# Patient Record
Sex: FEMALE | Race: WHITE | NOT HISPANIC OR LATINO
[De-identification: names, ages, dates, MRNs, and addresses within clinical notes are randomized per-mention and may not be internally consistent; named-entity substitution may affect disease eponyms.]

---

## 2017-07-28 PROBLEM — Z00.00 ENCOUNTER FOR PREVENTIVE HEALTH EXAMINATION: Status: ACTIVE | Noted: 2017-07-28

## 2017-08-02 ENCOUNTER — APPOINTMENT (OUTPATIENT)
Dept: SURGICAL ONCOLOGY | Facility: CLINIC | Age: 57
End: 2017-08-02
Payer: COMMERCIAL

## 2017-08-02 VITALS
HEIGHT: 66 IN | BODY MASS INDEX: 33.43 KG/M2 | DIASTOLIC BLOOD PRESSURE: 79 MMHG | HEART RATE: 86 BPM | WEIGHT: 208 LBS | OXYGEN SATURATION: 95 % | SYSTOLIC BLOOD PRESSURE: 120 MMHG

## 2017-08-02 DIAGNOSIS — Z86.19 PERSONAL HISTORY OF OTHER INFECTIOUS AND PARASITIC DISEASES: ICD-10-CM

## 2017-08-02 DIAGNOSIS — Z85.3 PERSONAL HISTORY OF MALIGNANT NEOPLASM OF BREAST: ICD-10-CM

## 2017-08-02 DIAGNOSIS — Z80.1 FAMILY HISTORY OF MALIGNANT NEOPLASM OF TRACHEA, BRONCHUS AND LUNG: ICD-10-CM

## 2017-08-02 PROCEDURE — 99205 OFFICE O/P NEW HI 60 MIN: CPT

## 2017-08-07 ENCOUNTER — OUTPATIENT (OUTPATIENT)
Dept: OUTPATIENT SERVICES | Facility: HOSPITAL | Age: 57
LOS: 1 days | End: 2017-08-07
Payer: COMMERCIAL

## 2017-08-07 ENCOUNTER — RESULT REVIEW (OUTPATIENT)
Age: 57
End: 2017-08-07

## 2017-08-07 DIAGNOSIS — N63 UNSPECIFIED LUMP IN BREAST: ICD-10-CM

## 2017-08-07 PROCEDURE — 88321 CONSLTJ&REPRT SLD PREP ELSWR: CPT

## 2017-08-08 LAB — SURGICAL PATHOLOGY STUDY: SIGNIFICANT CHANGE UP

## 2017-08-11 ENCOUNTER — OUTPATIENT (OUTPATIENT)
Dept: OUTPATIENT SERVICES | Facility: HOSPITAL | Age: 57
LOS: 1 days | End: 2017-08-11
Payer: COMMERCIAL

## 2017-08-11 ENCOUNTER — APPOINTMENT (OUTPATIENT)
Dept: ULTRASOUND IMAGING | Facility: IMAGING CENTER | Age: 57
End: 2017-08-11
Payer: COMMERCIAL

## 2017-08-11 VITALS
TEMPERATURE: 98 F | HEART RATE: 67 BPM | DIASTOLIC BLOOD PRESSURE: 88 MMHG | HEIGHT: 65 IN | OXYGEN SATURATION: 96 % | WEIGHT: 210.1 LBS | SYSTOLIC BLOOD PRESSURE: 122 MMHG | RESPIRATION RATE: 16 BRPM

## 2017-08-11 DIAGNOSIS — C50.919 MALIGNANT NEOPLASM OF UNSPECIFIED SITE OF UNSPECIFIED FEMALE BREAST: ICD-10-CM

## 2017-08-11 DIAGNOSIS — C50.912 MALIGNANT NEOPLASM OF UNSPECIFIED SITE OF LEFT FEMALE BREAST: ICD-10-CM

## 2017-08-11 DIAGNOSIS — Z01.818 ENCOUNTER FOR OTHER PREPROCEDURAL EXAMINATION: ICD-10-CM

## 2017-08-11 DIAGNOSIS — Z00.8 ENCOUNTER FOR OTHER GENERAL EXAMINATION: ICD-10-CM

## 2017-08-11 PROCEDURE — G0463: CPT

## 2017-08-11 PROCEDURE — 19285 PERQ DEV BREAST 1ST US IMAG: CPT | Mod: LT

## 2017-08-11 PROCEDURE — 19285 PERQ DEV BREAST 1ST US IMAG: CPT

## 2017-08-11 PROCEDURE — C1739: CPT

## 2017-08-11 NOTE — H&P PST ADULT - PROBLEM SELECTOR PLAN 1
Left breast lumpectomy post reflector saviscout localization & a left axillary sentinel lymph node biopsy

## 2017-08-11 NOTE — H&P PST ADULT - HISTORY OF PRESENT ILLNESS
56 y/o f with no sig PMH presents with recent dx of breast cancer found on biopsy pre left breast lumpectomy post reflector saviscout localization and a left axillary sentinel lymph node biopsy.

## 2017-08-14 RX ORDER — ACETAMINOPHEN 500 MG
975 TABLET ORAL ONCE
Qty: 0 | Refills: 0 | Status: COMPLETED | OUTPATIENT
Start: 2017-08-16 | End: 2017-08-16

## 2017-08-14 RX ORDER — CELECOXIB 200 MG/1
200 CAPSULE ORAL ONCE
Qty: 0 | Refills: 0 | Status: COMPLETED | OUTPATIENT
Start: 2017-08-16 | End: 2017-08-16

## 2017-08-14 RX ORDER — CEFAZOLIN SODIUM 1 G
2000 VIAL (EA) INJECTION ONCE
Qty: 0 | Refills: 0 | Status: DISCONTINUED | OUTPATIENT
Start: 2017-08-16 | End: 2017-08-31

## 2017-08-14 RX ORDER — SODIUM CHLORIDE 9 MG/ML
3 INJECTION INTRAMUSCULAR; INTRAVENOUS; SUBCUTANEOUS EVERY 8 HOURS
Qty: 0 | Refills: 0 | Status: DISCONTINUED | OUTPATIENT
Start: 2017-08-16 | End: 2017-08-31

## 2017-08-14 RX ORDER — LIDOCAINE HCL 20 MG/ML
0.2 VIAL (ML) INJECTION ONCE
Qty: 0 | Refills: 0 | Status: DISCONTINUED | OUTPATIENT
Start: 2017-08-16 | End: 2017-08-31

## 2017-08-16 ENCOUNTER — APPOINTMENT (OUTPATIENT)
Dept: NUCLEAR MEDICINE | Facility: HOSPITAL | Age: 57
End: 2017-08-16

## 2017-08-16 ENCOUNTER — RESULT REVIEW (OUTPATIENT)
Age: 57
End: 2017-08-16

## 2017-08-16 ENCOUNTER — TRANSCRIPTION ENCOUNTER (OUTPATIENT)
Age: 57
End: 2017-08-16

## 2017-08-16 ENCOUNTER — APPOINTMENT (OUTPATIENT)
Dept: SURGICAL ONCOLOGY | Facility: HOSPITAL | Age: 57
End: 2017-08-16
Payer: COMMERCIAL

## 2017-08-16 ENCOUNTER — OUTPATIENT (OUTPATIENT)
Dept: OUTPATIENT SERVICES | Facility: HOSPITAL | Age: 57
LOS: 1 days | End: 2017-08-16
Payer: COMMERCIAL

## 2017-08-16 VITALS
RESPIRATION RATE: 16 BRPM | OXYGEN SATURATION: 98 % | DIASTOLIC BLOOD PRESSURE: 71 MMHG | SYSTOLIC BLOOD PRESSURE: 114 MMHG | HEART RATE: 70 BPM | TEMPERATURE: 98 F

## 2017-08-16 VITALS — HEIGHT: 65 IN | WEIGHT: 210.1 LBS

## 2017-08-16 VITALS
DIASTOLIC BLOOD PRESSURE: 88 MMHG | TEMPERATURE: 98 F | RESPIRATION RATE: 16 BRPM | HEART RATE: 78 BPM | WEIGHT: 210.1 LBS | SYSTOLIC BLOOD PRESSURE: 122 MMHG | HEIGHT: 65 IN | OXYGEN SATURATION: 96 %

## 2017-08-16 DIAGNOSIS — C50.919 MALIGNANT NEOPLASM OF UNSPECIFIED SITE OF UNSPECIFIED FEMALE BREAST: ICD-10-CM

## 2017-08-16 DIAGNOSIS — C50.912 MALIGNANT NEOPLASM OF UNSPECIFIED SITE OF LEFT FEMALE BREAST: ICD-10-CM

## 2017-08-16 PROCEDURE — 38900 IO MAP OF SENT LYMPH NODE: CPT | Mod: LT

## 2017-08-16 PROCEDURE — 38525 BIOPSY/REMOVAL LYMPH NODES: CPT | Mod: LT

## 2017-08-16 PROCEDURE — 38308 INCISION OF LYMPH CHANNELS: CPT

## 2017-08-16 PROCEDURE — 88307 TISSUE EXAM BY PATHOLOGIST: CPT | Mod: 26

## 2017-08-16 PROCEDURE — 38790 INJECT FOR LYMPHATIC X-RAY: CPT | Mod: 59

## 2017-08-16 PROCEDURE — 19301 PARTIAL MASTECTOMY: CPT | Mod: LT

## 2017-08-16 PROCEDURE — 19302 P-MASTECTOMY W/LN REMOVAL: CPT | Mod: LT

## 2017-08-16 PROCEDURE — A9541: CPT

## 2017-08-16 PROCEDURE — 88305 TISSUE EXAM BY PATHOLOGIST: CPT

## 2017-08-16 PROCEDURE — 88307 TISSUE EXAM BY PATHOLOGIST: CPT

## 2017-08-16 PROCEDURE — 88305 TISSUE EXAM BY PATHOLOGIST: CPT | Mod: 26

## 2017-08-16 PROCEDURE — 76098 X-RAY EXAM SURGICAL SPECIMEN: CPT | Mod: 26

## 2017-08-16 PROCEDURE — 38900 IO MAP OF SENT LYMPH NODE: CPT

## 2017-08-16 PROCEDURE — 38792 RA TRACER ID OF SENTINL NODE: CPT | Mod: 59

## 2017-08-16 PROCEDURE — 76098 X-RAY EXAM SURGICAL SPECIMEN: CPT

## 2017-08-16 RX ORDER — ONDANSETRON 8 MG/1
4 TABLET, FILM COATED ORAL ONCE
Qty: 0 | Refills: 0 | Status: COMPLETED | OUTPATIENT
Start: 2017-08-16 | End: 2017-08-16

## 2017-08-16 RX ORDER — CELECOXIB 200 MG/1
200 CAPSULE ORAL ONCE
Qty: 0 | Refills: 0 | Status: COMPLETED | OUTPATIENT
Start: 2017-08-16 | End: 2017-08-16

## 2017-08-16 RX ORDER — OXYCODONE HYDROCHLORIDE 5 MG/1
10 TABLET ORAL ONCE
Qty: 0 | Refills: 0 | Status: DISCONTINUED | OUTPATIENT
Start: 2017-08-16 | End: 2017-08-16

## 2017-08-16 RX ORDER — FAMOTIDINE 10 MG/ML
0 INJECTION INTRAVENOUS
Qty: 0 | Refills: 0 | COMMUNITY

## 2017-08-16 RX ORDER — OXYCODONE HYDROCHLORIDE 5 MG/1
5 TABLET ORAL ONCE
Qty: 0 | Refills: 0 | Status: DISCONTINUED | OUTPATIENT
Start: 2017-08-16 | End: 2017-08-16

## 2017-08-16 RX ORDER — SODIUM CHLORIDE 9 MG/ML
1000 INJECTION INTRAMUSCULAR; INTRAVENOUS; SUBCUTANEOUS
Qty: 0 | Refills: 0 | Status: DISCONTINUED | OUTPATIENT
Start: 2017-08-16 | End: 2017-08-31

## 2017-08-16 RX ADMIN — Medication 975 MILLIGRAM(S): at 07:13

## 2017-08-16 RX ADMIN — OXYCODONE HYDROCHLORIDE 5 MILLIGRAM(S): 5 TABLET ORAL at 10:22

## 2017-08-16 RX ADMIN — CELECOXIB 200 MILLIGRAM(S): 200 CAPSULE ORAL at 10:21

## 2017-08-16 RX ADMIN — CELECOXIB 200 MILLIGRAM(S): 200 CAPSULE ORAL at 07:13

## 2017-08-16 RX ADMIN — ONDANSETRON 4 MILLIGRAM(S): 8 TABLET, FILM COATED ORAL at 10:22

## 2017-08-16 NOTE — PROGRESS NOTE ADULT - SUBJECTIVE AND OBJECTIVE BOX
Brief Operative Note    Attending: Dr. Francis    Resident:  Diana Poe    Preoperative Diagnosis: Malignant neoplasm of left female breast  Shingles  Breast cancer in female  No significant past surgical history  MALIGNANT NEOPLASM OF UNSPECIF      Postoperative Diagnosis: same    Procedure: L breast lumpectomy, L sentinnel lymph node biopsy    Findings: as dictated    EBL: minimal    Complications: none    Condition upon return to PACU: stable

## 2017-08-16 NOTE — ASU DISCHARGE PLAN (ADULT/PEDIATRIC). - MEDICATION SUMMARY - MEDICATIONS TO TAKE
I will START or STAY ON the medications listed below when I get home from the hospital:    oxycodone-acetaminophen 5mg-325mg oral tablet  -- 1 tab(s) by mouth every 6 hours, As Needed MDD:8 tablets daily  -- Caution federal law prohibits the transfer of this drug to any person other  than the person for whom it was prescribed.  May cause drowsiness.  Alcohol may intensify this effect.  Use care when operating dangerous machinery.  This prescription cannot be refilled.  This product contains acetaminophen.  Do not use  with any other product containing acetaminophen to prevent possible liver damage.  Using more of this medication than prescribed may cause serious breathing problems.    -- Indication: For Moderate-severe pain

## 2017-08-16 NOTE — PROGRESS NOTE ADULT - ASSESSMENT
57F with history of diagnosed L breast cancer s/p L breast lumpectomy with L sentinnel lymph node biopsy.

## 2017-08-16 NOTE — PRE-ANESTHESIA EVALUATION ADULT - NSANTHOSAYNRD_GEN_A_CORE
No. NEDA screening performed.  STOP BANG Legend: 0-2 = LOW Risk; 3-4 = INTERMEDIATE Risk; 5-8 = HIGH Risk

## 2017-08-22 LAB — SURGICAL PATHOLOGY STUDY: SIGNIFICANT CHANGE UP

## 2017-08-28 ENCOUNTER — APPOINTMENT (OUTPATIENT)
Dept: SURGICAL ONCOLOGY | Facility: CLINIC | Age: 57
End: 2017-08-28
Payer: COMMERCIAL

## 2017-08-28 VITALS
RESPIRATION RATE: 15 BRPM | BODY MASS INDEX: 32.95 KG/M2 | HEIGHT: 66 IN | DIASTOLIC BLOOD PRESSURE: 73 MMHG | HEART RATE: 73 BPM | OXYGEN SATURATION: 95 % | WEIGHT: 205 LBS | SYSTOLIC BLOOD PRESSURE: 105 MMHG

## 2017-08-28 PROCEDURE — 99024 POSTOP FOLLOW-UP VISIT: CPT

## 2017-09-13 ENCOUNTER — TRANSCRIPTION ENCOUNTER (OUTPATIENT)
Age: 57
End: 2017-09-13

## 2017-09-13 ENCOUNTER — OUTPATIENT (OUTPATIENT)
Dept: OUTPATIENT SERVICES | Facility: HOSPITAL | Age: 57
LOS: 1 days | End: 2017-09-13
Payer: COMMERCIAL

## 2017-09-13 ENCOUNTER — RESULT REVIEW (OUTPATIENT)
Age: 57
End: 2017-09-13

## 2017-09-13 ENCOUNTER — APPOINTMENT (OUTPATIENT)
Dept: SURGICAL ONCOLOGY | Facility: HOSPITAL | Age: 57
End: 2017-09-13
Payer: COMMERCIAL

## 2017-09-13 VITALS
OXYGEN SATURATION: 96 % | RESPIRATION RATE: 16 BRPM | SYSTOLIC BLOOD PRESSURE: 114 MMHG | HEART RATE: 71 BPM | TEMPERATURE: 99 F | DIASTOLIC BLOOD PRESSURE: 74 MMHG | WEIGHT: 210.1 LBS | HEIGHT: 65 IN

## 2017-09-13 VITALS
OXYGEN SATURATION: 97 % | RESPIRATION RATE: 18 BRPM | DIASTOLIC BLOOD PRESSURE: 78 MMHG | HEART RATE: 78 BPM | TEMPERATURE: 37 F | SYSTOLIC BLOOD PRESSURE: 120 MMHG

## 2017-09-13 DIAGNOSIS — C50.912 MALIGNANT NEOPLASM OF UNSPECIFIED SITE OF LEFT FEMALE BREAST: ICD-10-CM

## 2017-09-13 DIAGNOSIS — Z01.818 ENCOUNTER FOR OTHER PREPROCEDURAL EXAMINATION: ICD-10-CM

## 2017-09-13 PROCEDURE — 38745 REMOVE ARMPIT LYMPH NODES: CPT | Mod: 78,LT

## 2017-09-13 PROCEDURE — 19301 PARTIAL MASTECTOMY: CPT | Mod: LT,XS

## 2017-09-13 PROCEDURE — 38900 IO MAP OF SENT LYMPH NODE: CPT | Mod: LT

## 2017-09-13 PROCEDURE — 38745 REMOVE ARMPIT LYMPH NODES: CPT | Mod: LT

## 2017-09-13 PROCEDURE — 88307 TISSUE EXAM BY PATHOLOGIST: CPT | Mod: 26

## 2017-09-13 PROCEDURE — 88307 TISSUE EXAM BY PATHOLOGIST: CPT

## 2017-09-13 RX ORDER — CELECOXIB 200 MG/1
200 CAPSULE ORAL ONCE
Qty: 0 | Refills: 0 | Status: DISCONTINUED | OUTPATIENT
Start: 2017-09-13 | End: 2017-09-28

## 2017-09-13 RX ORDER — FAMOTIDINE 10 MG/ML
20 INJECTION INTRAVENOUS ONCE
Qty: 0 | Refills: 0 | Status: COMPLETED | OUTPATIENT
Start: 2017-09-13 | End: 2017-09-13

## 2017-09-13 RX ORDER — OXYCODONE HYDROCHLORIDE 5 MG/1
10 TABLET ORAL ONCE
Qty: 0 | Refills: 0 | Status: DISCONTINUED | OUTPATIENT
Start: 2017-09-13 | End: 2017-09-13

## 2017-09-13 RX ORDER — HYDROMORPHONE HYDROCHLORIDE 2 MG/ML
0.5 INJECTION INTRAMUSCULAR; INTRAVENOUS; SUBCUTANEOUS ONCE
Qty: 0 | Refills: 0 | Status: DISCONTINUED | OUTPATIENT
Start: 2017-09-13 | End: 2017-09-13

## 2017-09-13 RX ORDER — CELECOXIB 200 MG/1
200 CAPSULE ORAL ONCE
Qty: 0 | Refills: 0 | Status: COMPLETED | OUTPATIENT
Start: 2017-09-13 | End: 2017-09-13

## 2017-09-13 RX ORDER — SODIUM CHLORIDE 9 MG/ML
1000 INJECTION, SOLUTION INTRAVENOUS
Qty: 0 | Refills: 0 | Status: DISCONTINUED | OUTPATIENT
Start: 2017-09-13 | End: 2017-09-28

## 2017-09-13 RX ORDER — HYDROMORPHONE HYDROCHLORIDE 2 MG/ML
0.5 INJECTION INTRAMUSCULAR; INTRAVENOUS; SUBCUTANEOUS ONCE
Qty: 0 | Refills: 0 | Status: DISCONTINUED | OUTPATIENT
Start: 2017-09-13 | End: 2017-09-28

## 2017-09-13 RX ORDER — ONDANSETRON 8 MG/1
4 TABLET, FILM COATED ORAL ONCE
Qty: 0 | Refills: 0 | Status: COMPLETED | OUTPATIENT
Start: 2017-09-13 | End: 2017-09-13

## 2017-09-13 RX ORDER — ACETAMINOPHEN 500 MG
975 TABLET ORAL ONCE
Qty: 0 | Refills: 0 | Status: COMPLETED | OUTPATIENT
Start: 2017-09-13 | End: 2017-09-13

## 2017-09-13 RX ADMIN — OXYCODONE HYDROCHLORIDE 10 MILLIGRAM(S): 5 TABLET ORAL at 12:18

## 2017-09-13 RX ADMIN — Medication 975 MILLIGRAM(S): at 10:29

## 2017-09-13 RX ADMIN — CELECOXIB 200 MILLIGRAM(S): 200 CAPSULE ORAL at 10:29

## 2017-09-13 RX ADMIN — ONDANSETRON 4 MILLIGRAM(S): 8 TABLET, FILM COATED ORAL at 12:18

## 2017-09-13 RX ADMIN — FAMOTIDINE 20 MILLIGRAM(S): 10 INJECTION INTRAVENOUS at 10:29

## 2017-09-13 RX ADMIN — HYDROMORPHONE HYDROCHLORIDE 0.5 MILLIGRAM(S): 2 INJECTION INTRAMUSCULAR; INTRAVENOUS; SUBCUTANEOUS at 13:14

## 2017-09-13 NOTE — BRIEF OPERATIVE NOTE - PROCEDURE
<<-----Click on this checkbox to enter Procedure Axillary lymph node dissection on left  09/13/2017    Active  [id number]

## 2017-09-13 NOTE — ASU DISCHARGE PLAN (ADULT/PEDIATRIC). - NOTIFY
Fever greater than 101/Pain not relieved by Medications/Numbness, color, or temperature change to extremity

## 2017-09-13 NOTE — ASU DISCHARGE PLAN (ADULT/PEDIATRIC). - SPECIAL INSTRUCTIONS
Please empty your drain as instructed. You may take the Percocet for pain as instructed, but do not drive while taking them. You may shower after 1 day, but please keep the Steri strips on until they fall off and do not soak or swim. Please call the office sooner if you have any other concerns.

## 2017-09-14 ENCOUNTER — MESSAGE (OUTPATIENT)
Age: 57
End: 2017-09-14

## 2017-09-18 ENCOUNTER — APPOINTMENT (OUTPATIENT)
Dept: SURGICAL ONCOLOGY | Facility: CLINIC | Age: 57
End: 2017-09-18
Payer: COMMERCIAL

## 2017-09-18 VITALS
WEIGHT: 205 LBS | HEIGHT: 66 IN | SYSTOLIC BLOOD PRESSURE: 108 MMHG | TEMPERATURE: 99.5 F | DIASTOLIC BLOOD PRESSURE: 75 MMHG | RESPIRATION RATE: 16 BRPM | BODY MASS INDEX: 32.95 KG/M2 | OXYGEN SATURATION: 97 % | HEART RATE: 114 BPM

## 2017-09-18 LAB — SURGICAL PATHOLOGY STUDY: SIGNIFICANT CHANGE UP

## 2017-09-18 PROCEDURE — 10140 I&D HMTMA SEROMA/FLUID COLLJ: CPT | Mod: 79

## 2017-09-18 PROCEDURE — 99024 POSTOP FOLLOW-UP VISIT: CPT

## 2017-09-20 ENCOUNTER — APPOINTMENT (OUTPATIENT)
Dept: SURGICAL ONCOLOGY | Facility: CLINIC | Age: 57
End: 2017-09-20

## 2017-09-25 ENCOUNTER — APPOINTMENT (OUTPATIENT)
Dept: SURGICAL ONCOLOGY | Facility: CLINIC | Age: 57
End: 2017-09-25
Payer: COMMERCIAL

## 2017-09-25 VITALS
HEART RATE: 78 BPM | SYSTOLIC BLOOD PRESSURE: 94 MMHG | RESPIRATION RATE: 15 BRPM | OXYGEN SATURATION: 95 % | DIASTOLIC BLOOD PRESSURE: 64 MMHG | BODY MASS INDEX: 32.95 KG/M2 | WEIGHT: 205 LBS | HEIGHT: 66 IN

## 2017-09-25 PROCEDURE — 10140 I&D HMTMA SEROMA/FLUID COLLJ: CPT | Mod: 79

## 2017-09-25 PROCEDURE — 99024 POSTOP FOLLOW-UP VISIT: CPT

## 2017-09-28 ENCOUNTER — APPOINTMENT (OUTPATIENT)
Dept: SURGICAL ONCOLOGY | Facility: CLINIC | Age: 57
End: 2017-09-28
Payer: COMMERCIAL

## 2017-10-02 ENCOUNTER — APPOINTMENT (OUTPATIENT)
Dept: SURGICAL ONCOLOGY | Facility: CLINIC | Age: 57
End: 2017-10-02
Payer: COMMERCIAL

## 2017-10-02 VITALS
BODY MASS INDEX: 33.91 KG/M2 | OXYGEN SATURATION: 96 % | HEART RATE: 72 BPM | WEIGHT: 211 LBS | HEIGHT: 66 IN | RESPIRATION RATE: 16 BRPM | SYSTOLIC BLOOD PRESSURE: 117 MMHG | DIASTOLIC BLOOD PRESSURE: 79 MMHG

## 2017-10-02 PROCEDURE — 99024 POSTOP FOLLOW-UP VISIT: CPT

## 2017-10-04 ENCOUNTER — APPOINTMENT (OUTPATIENT)
Dept: SURGICAL ONCOLOGY | Facility: CLINIC | Age: 57
End: 2017-10-04
Payer: COMMERCIAL

## 2017-10-04 VITALS
SYSTOLIC BLOOD PRESSURE: 106 MMHG | DIASTOLIC BLOOD PRESSURE: 67 MMHG | OXYGEN SATURATION: 96 % | RESPIRATION RATE: 16 BRPM | WEIGHT: 211 LBS | HEART RATE: 65 BPM | HEIGHT: 66 IN | BODY MASS INDEX: 33.91 KG/M2

## 2017-10-04 PROCEDURE — 10140 I&D HMTMA SEROMA/FLUID COLLJ: CPT | Mod: 79

## 2017-10-04 PROCEDURE — 99024 POSTOP FOLLOW-UP VISIT: CPT

## 2017-10-09 ENCOUNTER — OUTPATIENT (OUTPATIENT)
Dept: OUTPATIENT SERVICES | Facility: HOSPITAL | Age: 57
LOS: 1 days | Discharge: HOME | End: 2017-10-09

## 2017-10-09 DIAGNOSIS — C50.919 MALIGNANT NEOPLASM OF UNSPECIFIED SITE OF UNSPECIFIED FEMALE BREAST: ICD-10-CM

## 2017-10-12 ENCOUNTER — APPOINTMENT (OUTPATIENT)
Dept: HEMATOLOGY ONCOLOGY | Facility: CLINIC | Age: 57
End: 2017-10-12
Payer: COMMERCIAL

## 2017-10-12 VITALS
HEIGHT: 66.42 IN | SYSTOLIC BLOOD PRESSURE: 126 MMHG | RESPIRATION RATE: 16 BRPM | HEART RATE: 73 BPM | DIASTOLIC BLOOD PRESSURE: 85 MMHG | OXYGEN SATURATION: 96 % | WEIGHT: 213.41 LBS | BODY MASS INDEX: 33.89 KG/M2 | TEMPERATURE: 98.3 F

## 2017-10-12 PROCEDURE — 99245 OFF/OP CONSLTJ NEW/EST HI 55: CPT

## 2017-10-13 DIAGNOSIS — C50.512 MALIGNANT NEOPLASM OF LOWER-OUTER QUADRANT OF LEFT FEMALE BREAST: ICD-10-CM

## 2017-10-13 RX ORDER — OXYCODONE AND ACETAMINOPHEN 10; 325 MG/1; MG/1
10-325 TABLET ORAL
Qty: 40 | Refills: 0 | Status: DISCONTINUED | COMMUNITY
Start: 2017-09-18 | End: 2017-10-13

## 2017-10-13 RX ORDER — FLUOXETINE HYDROCHLORIDE 20 MG/1
20 CAPSULE ORAL TWICE DAILY
Refills: 0 | Status: ACTIVE | COMMUNITY
Start: 2017-10-13

## 2017-10-13 RX ORDER — ELECTROLYTES/DEXTROSE
SOLUTION, ORAL ORAL DAILY
Refills: 0 | Status: ACTIVE | COMMUNITY
Start: 2017-10-13

## 2017-10-16 ENCOUNTER — APPOINTMENT (OUTPATIENT)
Dept: RADIATION ONCOLOGY | Facility: CLINIC | Age: 57
End: 2017-10-16
Payer: COMMERCIAL

## 2017-10-16 VITALS
OXYGEN SATURATION: 94 % | DIASTOLIC BLOOD PRESSURE: 83 MMHG | HEART RATE: 63 BPM | WEIGHT: 211.75 LBS | TEMPERATURE: 97.8 F | SYSTOLIC BLOOD PRESSURE: 134 MMHG | BODY MASS INDEX: 34.03 KG/M2 | RESPIRATION RATE: 16 BRPM | HEIGHT: 66 IN

## 2017-10-16 PROCEDURE — 99204 OFFICE O/P NEW MOD 45 MIN: CPT

## 2017-10-19 ENCOUNTER — RESULT REVIEW (OUTPATIENT)
Age: 57
End: 2017-10-19

## 2017-10-19 ENCOUNTER — OUTPATIENT (OUTPATIENT)
Dept: OUTPATIENT SERVICES | Facility: HOSPITAL | Age: 57
LOS: 1 days | End: 2017-10-19
Payer: COMMERCIAL

## 2017-10-19 DIAGNOSIS — C50.919 MALIGNANT NEOPLASM OF UNSPECIFIED SITE OF UNSPECIFIED FEMALE BREAST: ICD-10-CM

## 2017-10-19 PROCEDURE — 88363 XM ARCHIVE TISSUE MOLEC ANAL: CPT

## 2017-10-30 PROCEDURE — 77263 THER RADIOLOGY TX PLNG CPLX: CPT

## 2017-11-01 LAB — SURGICAL PATHOLOGY STUDY: SIGNIFICANT CHANGE UP

## 2017-11-02 PROCEDURE — 77333 RADIATION TREATMENT AID(S): CPT | Mod: 26

## 2017-11-02 PROCEDURE — 77290 THER RAD SIMULAJ FIELD CPLX: CPT | Mod: 26

## 2017-11-10 PROCEDURE — 77300 RADIATION THERAPY DOSE PLAN: CPT | Mod: 26

## 2017-11-10 PROCEDURE — 77295 3-D RADIOTHERAPY PLAN: CPT | Mod: 26

## 2017-11-10 PROCEDURE — 77334 RADIATION TREATMENT AID(S): CPT | Mod: 26

## 2017-11-16 PROCEDURE — 77280 THER RAD SIMULAJ FIELD SMPL: CPT | Mod: 26

## 2017-11-20 PROCEDURE — G6017: CPT

## 2017-11-21 PROCEDURE — G6017: CPT

## 2017-11-22 PROCEDURE — G6017: CPT

## 2017-11-24 PROCEDURE — G6017: CPT

## 2017-11-27 PROCEDURE — G6017: CPT

## 2017-11-28 PROCEDURE — G6017: CPT

## 2017-11-29 PROCEDURE — G6017: CPT

## 2017-11-30 PROCEDURE — 77427 RADIATION TX MANAGEMENT X5: CPT

## 2017-11-30 PROCEDURE — G6017: CPT

## 2017-12-01 PROCEDURE — G6017: CPT

## 2017-12-04 PROCEDURE — G6017: CPT

## 2017-12-05 PROCEDURE — G6017: CPT

## 2017-12-06 PROCEDURE — G6017: CPT

## 2017-12-07 PROCEDURE — G6017: CPT

## 2017-12-07 PROCEDURE — 77427 RADIATION TX MANAGEMENT X5: CPT

## 2017-12-08 PROCEDURE — G6017: CPT

## 2017-12-11 PROCEDURE — G6017: CPT

## 2017-12-12 PROCEDURE — G6017: CPT

## 2017-12-13 PROCEDURE — 77427 RADIATION TX MANAGEMENT X5: CPT

## 2017-12-13 PROCEDURE — 77290 THER RAD SIMULAJ FIELD CPLX: CPT | Mod: 26

## 2017-12-13 PROCEDURE — G6017: CPT

## 2017-12-15 PROCEDURE — G6017: CPT

## 2017-12-18 PROCEDURE — G6017: CPT

## 2017-12-19 PROCEDURE — G6017: CPT

## 2017-12-20 PROCEDURE — G6017: CPT

## 2017-12-21 PROCEDURE — 77427 RADIATION TX MANAGEMENT X5: CPT

## 2017-12-21 PROCEDURE — G6017: CPT

## 2017-12-22 PROCEDURE — G6017: CPT

## 2017-12-26 PROCEDURE — G6017: CPT

## 2017-12-27 PROCEDURE — G6017: CPT

## 2017-12-28 PROCEDURE — 77300 RADIATION THERAPY DOSE PLAN: CPT | Mod: 26

## 2017-12-28 PROCEDURE — 77334 RADIATION TREATMENT AID(S): CPT | Mod: 26

## 2017-12-28 PROCEDURE — G6017: CPT

## 2017-12-29 PROCEDURE — 77427 RADIATION TX MANAGEMENT X5: CPT

## 2017-12-29 PROCEDURE — G6017: CPT

## 2018-01-02 PROCEDURE — G6017: CPT

## 2018-01-03 PROCEDURE — G6017: CPT

## 2018-01-05 PROCEDURE — 77427 RADIATION TX MANAGEMENT X5: CPT

## 2018-01-05 PROCEDURE — G6017: CPT

## 2018-01-11 ENCOUNTER — APPOINTMENT (OUTPATIENT)
Dept: SURGICAL ONCOLOGY | Facility: CLINIC | Age: 58
End: 2018-01-11
Payer: COMMERCIAL

## 2018-01-11 VITALS
HEART RATE: 93 BPM | RESPIRATION RATE: 15 BRPM | OXYGEN SATURATION: 96 % | DIASTOLIC BLOOD PRESSURE: 79 MMHG | SYSTOLIC BLOOD PRESSURE: 123 MMHG | WEIGHT: 210 LBS | HEIGHT: 66 IN | BODY MASS INDEX: 33.75 KG/M2

## 2018-01-11 PROCEDURE — 99214 OFFICE O/P EST MOD 30 MIN: CPT

## 2018-02-09 ENCOUNTER — APPOINTMENT (OUTPATIENT)
Dept: RADIATION ONCOLOGY | Facility: CLINIC | Age: 58
End: 2018-02-09
Payer: COMMERCIAL

## 2018-02-09 VITALS
SYSTOLIC BLOOD PRESSURE: 113 MMHG | BODY MASS INDEX: 35.08 KG/M2 | RESPIRATION RATE: 15 BRPM | DIASTOLIC BLOOD PRESSURE: 62 MMHG | HEART RATE: 93 BPM | OXYGEN SATURATION: 94 % | WEIGHT: 218.26 LBS | TEMPERATURE: 98.2 F | HEIGHT: 66 IN

## 2018-02-09 PROCEDURE — 99024 POSTOP FOLLOW-UP VISIT: CPT

## 2018-04-18 ENCOUNTER — APPOINTMENT (OUTPATIENT)
Dept: SURGICAL ONCOLOGY | Facility: CLINIC | Age: 58
End: 2018-04-18
Payer: COMMERCIAL

## 2018-04-18 VITALS
HEART RATE: 85 BPM | SYSTOLIC BLOOD PRESSURE: 106 MMHG | WEIGHT: 215 LBS | BODY MASS INDEX: 34.55 KG/M2 | HEIGHT: 66 IN | RESPIRATION RATE: 15 BRPM | DIASTOLIC BLOOD PRESSURE: 71 MMHG

## 2018-04-18 PROCEDURE — 99214 OFFICE O/P EST MOD 30 MIN: CPT

## 2018-07-19 PROBLEM — B02.9 ZOSTER WITHOUT COMPLICATIONS: Chronic | Status: ACTIVE | Noted: 2017-08-11

## 2018-07-23 ENCOUNTER — APPOINTMENT (OUTPATIENT)
Dept: MAMMOGRAPHY | Facility: CLINIC | Age: 58
End: 2018-07-23

## 2018-07-23 ENCOUNTER — APPOINTMENT (OUTPATIENT)
Dept: ULTRASOUND IMAGING | Facility: CLINIC | Age: 58
End: 2018-07-23

## 2018-07-25 ENCOUNTER — APPOINTMENT (OUTPATIENT)
Dept: SURGICAL ONCOLOGY | Facility: CLINIC | Age: 58
End: 2018-07-25
Payer: COMMERCIAL

## 2018-07-25 VITALS
WEIGHT: 215 LBS | SYSTOLIC BLOOD PRESSURE: 109 MMHG | DIASTOLIC BLOOD PRESSURE: 71 MMHG | HEART RATE: 64 BPM | BODY MASS INDEX: 34.55 KG/M2 | HEIGHT: 66 IN

## 2018-07-25 PROCEDURE — 99214 OFFICE O/P EST MOD 30 MIN: CPT

## 2018-08-08 ENCOUNTER — RX RENEWAL (OUTPATIENT)
Age: 58
End: 2018-08-08

## 2018-10-18 LAB
ALBUMIN SERPL ELPH-MCNC: 4.5 G/DL
ALP BLD-CCNC: 69 U/L
ALT SERPL-CCNC: 16 U/L
AST SERPL-CCNC: 22 U/L
BILIRUB DIRECT SERPL-MCNC: 0.1 MG/DL
BILIRUB INDIRECT SERPL-MCNC: 0.3 MG/DL
BILIRUB SERPL-MCNC: 0.4 MG/DL
CANCER AG27-29 SERPL-ACNC: 18.7 U/ML
CEA SERPL-MCNC: 2 NG/ML
PROT SERPL-MCNC: 7.8 G/DL

## 2018-11-10 ENCOUNTER — LABORATORY RESULT (OUTPATIENT)
Age: 58
End: 2018-11-10

## 2018-11-14 ENCOUNTER — APPOINTMENT (OUTPATIENT)
Dept: SURGICAL ONCOLOGY | Facility: CLINIC | Age: 58
End: 2018-11-14
Payer: COMMERCIAL

## 2018-11-14 VITALS
WEIGHT: 215 LBS | SYSTOLIC BLOOD PRESSURE: 114 MMHG | BODY MASS INDEX: 34.55 KG/M2 | DIASTOLIC BLOOD PRESSURE: 72 MMHG | HEART RATE: 80 BPM | RESPIRATION RATE: 15 BRPM | HEIGHT: 66 IN

## 2018-11-14 PROCEDURE — 99214 OFFICE O/P EST MOD 30 MIN: CPT

## 2019-01-22 ENCOUNTER — OUTPATIENT (OUTPATIENT)
Dept: OUTPATIENT SERVICES | Facility: HOSPITAL | Age: 59
LOS: 1 days | End: 2019-01-22

## 2019-01-22 DIAGNOSIS — N63.20 UNSPECIFIED LUMP IN THE LEFT BREAST, UNSPECIFIED QUADRANT: ICD-10-CM

## 2019-02-02 ENCOUNTER — APPOINTMENT (OUTPATIENT)
Dept: ULTRASOUND IMAGING | Facility: IMAGING CENTER | Age: 59
End: 2019-02-02

## 2019-02-02 ENCOUNTER — OUTPATIENT (OUTPATIENT)
Dept: OUTPATIENT SERVICES | Facility: HOSPITAL | Age: 59
LOS: 1 days | End: 2019-02-02
Payer: COMMERCIAL

## 2019-02-02 ENCOUNTER — APPOINTMENT (OUTPATIENT)
Dept: MAMMOGRAPHY | Facility: IMAGING CENTER | Age: 59
End: 2019-02-02

## 2019-02-02 ENCOUNTER — APPOINTMENT (OUTPATIENT)
Dept: MAMMOGRAPHY | Facility: IMAGING CENTER | Age: 59
End: 2019-02-02
Payer: COMMERCIAL

## 2019-02-02 DIAGNOSIS — Z08 ENCOUNTER FOR FOLLOW-UP EXAMINATION AFTER COMPLETED TREATMENT FOR MALIGNANT NEOPLASM: ICD-10-CM

## 2019-02-02 PROCEDURE — 76641 ULTRASOUND BREAST COMPLETE: CPT

## 2019-02-02 PROCEDURE — G0279: CPT | Mod: 26

## 2019-02-02 PROCEDURE — 77065 DX MAMMO INCL CAD UNI: CPT

## 2019-02-02 PROCEDURE — 77065 DX MAMMO INCL CAD UNI: CPT | Mod: 26,LT

## 2019-02-02 PROCEDURE — 76641 ULTRASOUND BREAST COMPLETE: CPT | Mod: 26,LT

## 2019-02-02 PROCEDURE — G0279: CPT

## 2019-03-19 ENCOUNTER — LABORATORY RESULT (OUTPATIENT)
Age: 59
End: 2019-03-19

## 2019-03-27 ENCOUNTER — APPOINTMENT (OUTPATIENT)
Dept: SURGICAL ONCOLOGY | Facility: CLINIC | Age: 59
End: 2019-03-27
Payer: COMMERCIAL

## 2019-03-27 VITALS
BODY MASS INDEX: 32.95 KG/M2 | TEMPERATURE: 98.5 F | HEIGHT: 66 IN | SYSTOLIC BLOOD PRESSURE: 122 MMHG | WEIGHT: 205 LBS | HEART RATE: 68 BPM | RESPIRATION RATE: 16 BRPM | OXYGEN SATURATION: 94 % | DIASTOLIC BLOOD PRESSURE: 76 MMHG

## 2019-03-27 PROCEDURE — 99214 OFFICE O/P EST MOD 30 MIN: CPT

## 2019-03-27 NOTE — HISTORY OF PRESENT ILLNESS
[de-identified] : Amalia is a 58 year old female presents for an ongoing breast cancer follow up.\par \par She is s/p left lumpectomy and left axillary sentinel lymph node biopsy on 8/16/17. Final pathology revealed infiltrating duct carcinoma, tumor size 2.5 x 1.5 cm, moderately differentiated, DCIS, cribriform and solid pattern, intermediate nuclear grade. 2/2 lymph nodes showing metastatic mammary duct carcinoma, negative margins. ER/RI(+), HER2(-). Tumor staging: pT2 pN1a.  Oncotype 14.  She is also s/p left axillary dissection on 9/13/17. Final pathology revealed 12 lymph nodes NEGATIVE for carcinoma, negative margins. She completed Radiation Therapy with Dr. Aleman on 1/5/18.  No chemotherapy.\par \par On Arimidex 1mg daily and states that she is experiencing bilateral knee and hand discomfort.  She had noticed some difficulty with movement of her Right thumb which she is concerned about.  She is also noticing problems with her libido.\par \par Bloodwork March 2019:\par CEA: 1.8 ng/mL\par CA 27.29: 18.2 U/mL\par LFTs WNL\par \par Most recent breast imaging includes a mammo/sono from 7/20/18 which showed probably benign Left breast surgical scarring and radiation changes as well as benign Left breast cysts for which Left breast mammo/sono (Birads 3).  Repeat Left mammo/sono was without evidence of malignancy (Birads 2). \par \par Today she is without any complaints. Denies palpable breast masses, nipple discharge, skin changes, inversion or breast pain.   Denies constitutional symptoms.

## 2019-03-27 NOTE — ASSESSMENT
[FreeTextEntry1] : IMP:\par LISA - hx infiltrating ductal carcinoma and DCIS\par On Arimidex 1 mg daily\par Right thumb stiffness and weakness\par \par Plan:\par RTO q 4 months with BW\par Mammo/sono July 2019\par Thumb evaluation by Dr. Agustín De La Paz\par \par \par

## 2019-03-27 NOTE — PHYSICAL EXAM
[Normal] : supple, no neck mass and thyroid not enlarged [Normal Supraclavicular Lymph Nodes] : normal supraclavicular lymph nodes [Normal Axillary Lymph Nodes] : normal axillary lymph nodes [Normal] : oriented to person, place and time, with appropriate affect [de-identified] : Well healed Left lumpectomy incision. Minor hyperpigmentation of Left breast secondary to RT. No palpable masses in Right breast.

## 2019-07-19 ENCOUNTER — LABORATORY RESULT (OUTPATIENT)
Age: 59
End: 2019-07-19

## 2019-07-23 ENCOUNTER — APPOINTMENT (OUTPATIENT)
Dept: MAMMOGRAPHY | Facility: IMAGING CENTER | Age: 59
End: 2019-07-23
Payer: COMMERCIAL

## 2019-07-23 ENCOUNTER — APPOINTMENT (OUTPATIENT)
Dept: ULTRASOUND IMAGING | Facility: IMAGING CENTER | Age: 59
End: 2019-07-23
Payer: COMMERCIAL

## 2019-07-23 ENCOUNTER — OUTPATIENT (OUTPATIENT)
Dept: OUTPATIENT SERVICES | Facility: HOSPITAL | Age: 59
LOS: 1 days | End: 2019-07-23
Payer: COMMERCIAL

## 2019-07-23 DIAGNOSIS — Z00.8 ENCOUNTER FOR OTHER GENERAL EXAMINATION: ICD-10-CM

## 2019-07-23 PROCEDURE — G0279: CPT | Mod: 26

## 2019-07-23 PROCEDURE — 76641 ULTRASOUND BREAST COMPLETE: CPT | Mod: 26,50

## 2019-07-23 PROCEDURE — 77066 DX MAMMO INCL CAD BI: CPT | Mod: 26

## 2019-07-23 PROCEDURE — G0279: CPT

## 2019-07-23 PROCEDURE — 76641 ULTRASOUND BREAST COMPLETE: CPT

## 2019-07-23 PROCEDURE — 77066 DX MAMMO INCL CAD BI: CPT

## 2019-07-31 ENCOUNTER — APPOINTMENT (OUTPATIENT)
Dept: SURGICAL ONCOLOGY | Facility: CLINIC | Age: 59
End: 2019-07-31

## 2019-07-31 ENCOUNTER — APPOINTMENT (OUTPATIENT)
Dept: ULTRASOUND IMAGING | Facility: IMAGING CENTER | Age: 59
End: 2019-07-31

## 2019-07-31 ENCOUNTER — LABORATORY RESULT (OUTPATIENT)
Age: 59
End: 2019-07-31

## 2019-08-01 ENCOUNTER — APPOINTMENT (OUTPATIENT)
Dept: SURGICAL ONCOLOGY | Facility: CLINIC | Age: 59
End: 2019-08-01
Payer: COMMERCIAL

## 2019-08-01 PROCEDURE — 19083 BX BREAST 1ST LESION US IMAG: CPT

## 2019-08-01 NOTE — ASSESSMENT
[FreeTextEntry1] : New right breast mass on screening sonogram\par Plan:\par Check pathology of biopsy\par RTO 6 months with bloodwork and right sonogram

## 2019-08-12 ENCOUNTER — RX RENEWAL (OUTPATIENT)
Age: 59
End: 2019-08-12

## 2019-08-19 ENCOUNTER — FORM ENCOUNTER (OUTPATIENT)
Age: 59
End: 2019-08-19

## 2019-08-20 ENCOUNTER — APPOINTMENT (OUTPATIENT)
Dept: MRI IMAGING | Facility: IMAGING CENTER | Age: 59
End: 2019-08-20
Payer: COMMERCIAL

## 2019-08-20 ENCOUNTER — OUTPATIENT (OUTPATIENT)
Dept: OUTPATIENT SERVICES | Facility: HOSPITAL | Age: 59
LOS: 1 days | End: 2019-08-20
Payer: COMMERCIAL

## 2019-08-20 DIAGNOSIS — Z08 ENCOUNTER FOR FOLLOW-UP EXAMINATION AFTER COMPLETED TREATMENT FOR MALIGNANT NEOPLASM: ICD-10-CM

## 2019-08-20 PROCEDURE — C8937: CPT

## 2019-08-20 PROCEDURE — A9585: CPT

## 2019-08-20 PROCEDURE — C8908: CPT

## 2019-08-20 PROCEDURE — 77049 MRI BREAST C-+ W/CAD BI: CPT | Mod: 26

## 2019-09-11 ENCOUNTER — OUTPATIENT (OUTPATIENT)
Dept: OUTPATIENT SERVICES | Facility: HOSPITAL | Age: 59
LOS: 1 days | End: 2019-09-11
Payer: COMMERCIAL

## 2019-09-11 VITALS
WEIGHT: 216.05 LBS | HEART RATE: 71 BPM | TEMPERATURE: 98 F | RESPIRATION RATE: 16 BRPM | HEIGHT: 65 IN | DIASTOLIC BLOOD PRESSURE: 79 MMHG | SYSTOLIC BLOOD PRESSURE: 120 MMHG | OXYGEN SATURATION: 97 %

## 2019-09-11 DIAGNOSIS — C50.911 MALIGNANT NEOPLASM OF UNSPECIFIED SITE OF RIGHT FEMALE BREAST: ICD-10-CM

## 2019-09-11 DIAGNOSIS — Z98.890 OTHER SPECIFIED POSTPROCEDURAL STATES: Chronic | ICD-10-CM

## 2019-09-11 DIAGNOSIS — Z01.818 ENCOUNTER FOR OTHER PREPROCEDURAL EXAMINATION: ICD-10-CM

## 2019-09-11 LAB
ANION GAP SERPL CALC-SCNC: 13 MMOL/L — SIGNIFICANT CHANGE UP (ref 5–17)
BUN SERPL-MCNC: 16 MG/DL — SIGNIFICANT CHANGE UP (ref 7–23)
CALCIUM SERPL-MCNC: 10.2 MG/DL — SIGNIFICANT CHANGE UP (ref 8.4–10.5)
CHLORIDE SERPL-SCNC: 104 MMOL/L — SIGNIFICANT CHANGE UP (ref 96–108)
CO2 SERPL-SCNC: 23 MMOL/L — SIGNIFICANT CHANGE UP (ref 22–31)
CREAT SERPL-MCNC: 0.53 MG/DL — SIGNIFICANT CHANGE UP (ref 0.5–1.3)
GLUCOSE SERPL-MCNC: 158 MG/DL — HIGH (ref 70–99)
HCT VFR BLD CALC: 44.4 % — SIGNIFICANT CHANGE UP (ref 34.5–45)
HGB BLD-MCNC: 14 G/DL — SIGNIFICANT CHANGE UP (ref 11.5–15.5)
MCHC RBC-ENTMCNC: 30.4 PG — SIGNIFICANT CHANGE UP (ref 27–34)
MCHC RBC-ENTMCNC: 31.5 GM/DL — LOW (ref 32–36)
MCV RBC AUTO: 96.5 FL — SIGNIFICANT CHANGE UP (ref 80–100)
PLATELET # BLD AUTO: 370 K/UL — SIGNIFICANT CHANGE UP (ref 150–400)
POTASSIUM SERPL-MCNC: 3.9 MMOL/L — SIGNIFICANT CHANGE UP (ref 3.5–5.3)
POTASSIUM SERPL-SCNC: 3.9 MMOL/L — SIGNIFICANT CHANGE UP (ref 3.5–5.3)
RBC # BLD: 4.6 M/UL — SIGNIFICANT CHANGE UP (ref 3.8–5.2)
RBC # FLD: 12.1 % — SIGNIFICANT CHANGE UP (ref 10.3–14.5)
SODIUM SERPL-SCNC: 140 MMOL/L — SIGNIFICANT CHANGE UP (ref 135–145)
WBC # BLD: 8.12 K/UL — SIGNIFICANT CHANGE UP (ref 3.8–10.5)
WBC # FLD AUTO: 8.12 K/UL — SIGNIFICANT CHANGE UP (ref 3.8–10.5)

## 2019-09-11 PROCEDURE — G0463: CPT

## 2019-09-11 PROCEDURE — 80048 BASIC METABOLIC PNL TOTAL CA: CPT

## 2019-09-11 PROCEDURE — 85027 COMPLETE CBC AUTOMATED: CPT

## 2019-09-11 RX ORDER — LIDOCAINE HCL 20 MG/ML
0.2 VIAL (ML) INJECTION ONCE
Refills: 0 | Status: DISCONTINUED | OUTPATIENT
Start: 2019-09-18 | End: 2019-10-06

## 2019-09-11 RX ORDER — SODIUM CHLORIDE 9 MG/ML
3 INJECTION INTRAMUSCULAR; INTRAVENOUS; SUBCUTANEOUS EVERY 8 HOURS
Refills: 0 | Status: DISCONTINUED | OUTPATIENT
Start: 2019-09-18 | End: 2019-10-06

## 2019-09-11 NOTE — H&P PST ADULT - NSANTHGENDERRD_ENT_A_CORE
Medication: Levothyroxine 75 mcg  Last office visit 01/28/2019  Last fill 05/30/2018  Next scheduled appointment none  Last lab 05/02/2018      Lefted message on VM asking patient to call and schedule an appointment w/provider.   No

## 2019-09-11 NOTE — H&P PST ADULT - NSICDXPROBLEM_GEN_ALL_CORE_FT
PROBLEM DIAGNOSES  Problem: Malignant neoplasm of right female breast  Assessment and Plan: Right breast madeleine  localized lumpectomy.

## 2019-09-11 NOTE — H&P PST ADULT - HISTORY OF PRESENT ILLNESS
58 y/o female with PMH of Left breast Ca s/p  left breast lumpectomy, Left axillary sentinel lymph node biopsy (08/16/17) and Left axillary lymph node completion (09/13/17), RT presents with recent diagnosis of Right breast neoplasm (LCIS). She is scheduled for Right breast madeleine  localized lumpectomy.

## 2019-09-11 NOTE — H&P PST ADULT - NSICDXPASTMEDICALHX_GEN_ALL_CORE_FT
PAST MEDICAL HISTORY:  Breast cancer in female left breast, Stage 2, 2017 s/p lumpectomy, RT    Lumbar herniated disc     Malignant neoplasm of female breast - Right LCIS, 2019    Shingles h/o left eye

## 2019-09-11 NOTE — H&P PST ADULT - NSICDXPASTSURGICALHX_GEN_ALL_CORE_FT
PAST SURGICAL HISTORY:  S/P lumpectomy, left breast post reflector madeleine  localization and a left axillary sentinel lymph node biopsy (0816/17), Left axyllary lympph node completion, 09/13/17

## 2019-09-12 PROBLEM — C50.919 MALIGNANT NEOPLASM OF UNSPECIFIED SITE OF UNSPECIFIED FEMALE BREAST: Chronic | Status: ACTIVE | Noted: 2017-08-11

## 2019-09-12 PROBLEM — M51.26 OTHER INTERVERTEBRAL DISC DISPLACEMENT, LUMBAR REGION: Chronic | Status: ACTIVE | Noted: 2019-09-11

## 2019-09-12 PROBLEM — C50.919 MALIGNANT NEOPLASM OF UNSPECIFIED SITE OF UNSPECIFIED FEMALE BREAST: Chronic | Status: ACTIVE | Noted: 2019-09-11

## 2019-09-15 ENCOUNTER — FORM ENCOUNTER (OUTPATIENT)
Age: 59
End: 2019-09-15

## 2019-09-16 ENCOUNTER — OUTPATIENT (OUTPATIENT)
Dept: OUTPATIENT SERVICES | Facility: HOSPITAL | Age: 59
LOS: 1 days | End: 2019-09-16
Payer: COMMERCIAL

## 2019-09-16 ENCOUNTER — APPOINTMENT (OUTPATIENT)
Dept: ULTRASOUND IMAGING | Facility: CLINIC | Age: 59
End: 2019-09-16
Payer: COMMERCIAL

## 2019-09-16 ENCOUNTER — FORM ENCOUNTER (OUTPATIENT)
Age: 59
End: 2019-09-16

## 2019-09-16 DIAGNOSIS — N63.10 UNSPECIFIED LUMP IN THE RIGHT BREAST, UNSPECIFIED QUADRANT: ICD-10-CM

## 2019-09-16 DIAGNOSIS — Z98.890 OTHER SPECIFIED POSTPROCEDURAL STATES: Chronic | ICD-10-CM

## 2019-09-16 PROCEDURE — C1739: CPT

## 2019-09-16 PROCEDURE — 19285 PERQ DEV BREAST 1ST US IMAG: CPT

## 2019-09-16 PROCEDURE — 19285 PERQ DEV BREAST 1ST US IMAG: CPT | Mod: RT

## 2019-09-17 ENCOUNTER — APPOINTMENT (OUTPATIENT)
Dept: MAMMOGRAPHY | Facility: CLINIC | Age: 59
End: 2019-09-17
Payer: COMMERCIAL

## 2019-09-17 ENCOUNTER — OUTPATIENT (OUTPATIENT)
Dept: OUTPATIENT SERVICES | Facility: HOSPITAL | Age: 59
LOS: 1 days | End: 2019-09-17
Payer: COMMERCIAL

## 2019-09-17 ENCOUNTER — FORM ENCOUNTER (OUTPATIENT)
Age: 59
End: 2019-09-17

## 2019-09-17 DIAGNOSIS — Z00.8 ENCOUNTER FOR OTHER GENERAL EXAMINATION: ICD-10-CM

## 2019-09-17 DIAGNOSIS — Z98.890 OTHER SPECIFIED POSTPROCEDURAL STATES: Chronic | ICD-10-CM

## 2019-09-17 PROCEDURE — C1739: CPT

## 2019-09-17 PROCEDURE — 19281 PERQ DEVICE BREAST 1ST IMAG: CPT

## 2019-09-17 PROCEDURE — 19281 PERQ DEVICE BREAST 1ST IMAG: CPT | Mod: RT

## 2019-09-17 RX ORDER — ONDANSETRON 8 MG/1
4 TABLET, FILM COATED ORAL ONCE
Refills: 0 | Status: DISCONTINUED | OUTPATIENT
Start: 2019-09-18 | End: 2019-10-06

## 2019-09-17 RX ORDER — SODIUM CHLORIDE 9 MG/ML
1000 INJECTION, SOLUTION INTRAVENOUS
Refills: 0 | Status: DISCONTINUED | OUTPATIENT
Start: 2019-09-18 | End: 2019-10-06

## 2019-09-17 RX ORDER — OXYCODONE HYDROCHLORIDE 5 MG/1
5 TABLET ORAL ONCE
Refills: 0 | Status: DISCONTINUED | OUTPATIENT
Start: 2019-09-18 | End: 2019-09-18

## 2019-09-17 RX ORDER — CELECOXIB 200 MG/1
200 CAPSULE ORAL ONCE
Refills: 0 | Status: DISCONTINUED | OUTPATIENT
Start: 2019-09-18 | End: 2019-10-06

## 2019-09-18 ENCOUNTER — OUTPATIENT (OUTPATIENT)
Dept: OUTPATIENT SERVICES | Facility: HOSPITAL | Age: 59
LOS: 1 days | End: 2019-09-18
Payer: COMMERCIAL

## 2019-09-18 ENCOUNTER — RESULT REVIEW (OUTPATIENT)
Age: 59
End: 2019-09-18

## 2019-09-18 ENCOUNTER — APPOINTMENT (OUTPATIENT)
Dept: SURGICAL ONCOLOGY | Facility: HOSPITAL | Age: 59
End: 2019-09-18

## 2019-09-18 VITALS
SYSTOLIC BLOOD PRESSURE: 112 MMHG | HEART RATE: 70 BPM | RESPIRATION RATE: 16 BRPM | DIASTOLIC BLOOD PRESSURE: 71 MMHG | OXYGEN SATURATION: 96 % | TEMPERATURE: 97 F

## 2019-09-18 VITALS
HEART RATE: 71 BPM | WEIGHT: 216.05 LBS | SYSTOLIC BLOOD PRESSURE: 120 MMHG | TEMPERATURE: 99 F | HEIGHT: 65 IN | RESPIRATION RATE: 16 BRPM | DIASTOLIC BLOOD PRESSURE: 79 MMHG | OXYGEN SATURATION: 97 %

## 2019-09-18 DIAGNOSIS — C50.911 MALIGNANT NEOPLASM OF UNSPECIFIED SITE OF RIGHT FEMALE BREAST: ICD-10-CM

## 2019-09-18 DIAGNOSIS — Z98.890 OTHER SPECIFIED POSTPROCEDURAL STATES: Chronic | ICD-10-CM

## 2019-09-18 PROCEDURE — 76098 X-RAY EXAM SURGICAL SPECIMEN: CPT | Mod: 26

## 2019-09-18 PROCEDURE — 19301 PARTIAL MASTECTOMY: CPT | Mod: RT

## 2019-09-18 PROCEDURE — 88307 TISSUE EXAM BY PATHOLOGIST: CPT | Mod: 26

## 2019-09-18 PROCEDURE — 88307 TISSUE EXAM BY PATHOLOGIST: CPT

## 2019-09-18 PROCEDURE — 88305 TISSUE EXAM BY PATHOLOGIST: CPT | Mod: 26

## 2019-09-18 PROCEDURE — 19366: CPT | Mod: 59

## 2019-09-18 PROCEDURE — 19301 PARTIAL MASTECTOMY: CPT

## 2019-09-18 PROCEDURE — 88305 TISSUE EXAM BY PATHOLOGIST: CPT

## 2019-09-18 PROCEDURE — 76098 X-RAY EXAM SURGICAL SPECIMEN: CPT

## 2019-09-18 RX ORDER — CELECOXIB 200 MG/1
200 CAPSULE ORAL ONCE
Refills: 0 | Status: COMPLETED | OUTPATIENT
Start: 2019-09-18 | End: 2019-09-18

## 2019-09-18 RX ORDER — CEFAZOLIN SODIUM 1 G
2000 VIAL (EA) INJECTION ONCE
Refills: 0 | Status: COMPLETED | OUTPATIENT
Start: 2019-09-18 | End: 2019-09-18

## 2019-09-18 RX ORDER — ACETAMINOPHEN 500 MG
1000 TABLET ORAL ONCE
Refills: 0 | Status: COMPLETED | OUTPATIENT
Start: 2019-09-18 | End: 2019-09-18

## 2019-09-18 RX ORDER — FLUOXETINE HCL 10 MG
20 CAPSULE ORAL
Qty: 0 | Refills: 0 | DISCHARGE

## 2019-09-18 RX ORDER — APREPITANT 80 MG/1
40 CAPSULE ORAL ONCE
Refills: 0 | Status: COMPLETED | OUTPATIENT
Start: 2019-09-18 | End: 2019-09-18

## 2019-09-18 RX ORDER — CHLORHEXIDINE GLUCONATE 213 G/1000ML
1 SOLUTION TOPICAL ONCE
Refills: 0 | Status: COMPLETED | OUTPATIENT
Start: 2019-09-18 | End: 2019-09-18

## 2019-09-18 RX ORDER — ANASTROZOLE 1 MG/1
1 TABLET ORAL
Qty: 0 | Refills: 0 | DISCHARGE

## 2019-09-18 RX ORDER — CELECOXIB 200 MG/1
1 CAPSULE ORAL
Qty: 0 | Refills: 0 | DISCHARGE
Start: 2019-09-18

## 2019-09-18 RX ADMIN — Medication 1000 MILLIGRAM(S): at 07:21

## 2019-09-18 RX ADMIN — APREPITANT 40 MILLIGRAM(S): 80 CAPSULE ORAL at 07:21

## 2019-09-18 RX ADMIN — CELECOXIB 200 MILLIGRAM(S): 200 CAPSULE ORAL at 07:21

## 2019-09-18 RX ADMIN — CHLORHEXIDINE GLUCONATE 1 APPLICATION(S): 213 SOLUTION TOPICAL at 07:22

## 2019-09-18 NOTE — ASU DISCHARGE PLAN (ADULT/PEDIATRIC) - ASU DC SPECIAL INSTRUCTIONSFT
WOUND CARE: You may remove your outer dressing today. Steri strips will fall off on their own.   BATHING: Please do not submerge wound underwater. You may shower and/or sponge bathe starting tomorrow. .  ACTIVITY: No heavy lifting anything more than 10-15lbs or straining. Otherwise, you may return to your usual level of physical activity.   DIET: Regular   NOTIFY YOUR SURGEON IF: You have any bleeding that does not stop, any pus draining from your wound, any fever (over 100.4 F) or chills, persistent nausea/vomiting with inability to tolerate food or liquids, persistent diarrhea, or if your pain is not controlled on your discharge pain medications.  FOLLOW-UP:  1. Please call to make a follow-up appointment within one week of discharge   2. Please follow up with your primary care physician in one week regarding your surgery.

## 2019-09-18 NOTE — ASU PATIENT PROFILE, ADULT - PSH
S/P lumpectomy, left breast  post reflector madeleine  localization and a left axillary sentinel lymph node biopsy (0816/17), Left axyllary lympph node completion, 09/13/17

## 2019-09-18 NOTE — BRIEF OPERATIVE NOTE - OPERATION/FINDINGS
Kay used to locate and extract right breast cancer. kay extraction confirmed with radiology. hemostasis achieved.

## 2019-09-18 NOTE — ASU DISCHARGE PLAN (ADULT/PEDIATRIC) - CALL YOUR DOCTOR IF YOU HAVE ANY OF THE FOLLOWING:
Pain not relieved by Medications/Nausea and vomiting that does not stop/Bleeding that does not stop/Fever greater than (need to indicate Fahrenheit or Celsius)/Wound/Surgical Site with redness, or foul smelling discharge or pus/Numbness, tingling, color or temperature change to extremity/Swelling that gets worse

## 2019-09-18 NOTE — ASU PATIENT PROFILE, ADULT - PMH
Breast cancer in female  left breast, Stage 2, 2017 s/p lumpectomy, RT  Lumbar herniated disc    Malignant neoplasm of female breast  - Right LCIS, 2019  Shingles  h/o left eye

## 2019-09-18 NOTE — BRIEF OPERATIVE NOTE - SPECIMENS
right breast, lumpectomy, right breast: superior margin, inferior margin, lateral margin, medial margin, deep margin

## 2019-09-18 NOTE — ASU DISCHARGE PLAN (ADULT/PEDIATRIC) - CARE PROVIDER_API CALL
Severiano Francis (MD)  Surgery  [address] [place] [id number]  Phone: [phone]  Fax: [phone]  Follow Up Time: 1 week

## 2019-09-23 LAB — SURGICAL PATHOLOGY STUDY: SIGNIFICANT CHANGE UP

## 2019-09-30 ENCOUNTER — APPOINTMENT (OUTPATIENT)
Dept: SURGICAL ONCOLOGY | Facility: CLINIC | Age: 59
End: 2019-09-30
Payer: COMMERCIAL

## 2019-09-30 VITALS
HEIGHT: 66 IN | HEART RATE: 80 BPM | RESPIRATION RATE: 18 BRPM | DIASTOLIC BLOOD PRESSURE: 84 MMHG | BODY MASS INDEX: 32.95 KG/M2 | TEMPERATURE: 98.3 F | OXYGEN SATURATION: 93 % | WEIGHT: 205 LBS | SYSTOLIC BLOOD PRESSURE: 134 MMHG

## 2019-09-30 PROCEDURE — 99214 OFFICE O/P EST MOD 30 MIN: CPT | Mod: 24

## 2019-09-30 NOTE — HISTORY OF PRESENT ILLNESS
[de-identified] : 59 year old female presents for an initial post op visit, s/p Right MARIBEL  localized lumpectomy for LCIS on 9/18/19.  Final pathology revealed LCIS, sclerosing adenosis, biopsy site changes, pseudo-lactational change, negative margins. \par \par Denies pain, fever or chills.

## 2019-09-30 NOTE — ASSESSMENT
[FreeTextEntry1] : IMP:\par s/p Right MARIBEL  localized lumpectomy for LCIS on 9/18/19.  Final pathology revealed LCIS, sclerosing adenosis, biopsy site changes, pseudo-lactational change, negative margins. \par LISA from left breast cancer on Arimidex 1 mg daily\par \par \par PLAN:\par Long discussion of implications of LCIS\par RTO 3 months \par Then continue regular Breast cancer follow-up for left side\par Bilateral mammogram and sonogram July, 2020

## 2019-09-30 NOTE — CONSULT LETTER
[Dear  ___] : Dear  [unfilled], [Courtesy Letter:] : I had the pleasure of seeing your patient, [unfilled], in my office today. [Please see my note below.] : Please see my note below. [Consult Closing:] : Thank you very much for allowing me to participate in the care of this patient.  If you have any questions, please do not hesitate to contact me. [Sincerely,] : Sincerely, [FreeTextEntry1] : I will keep you informed of my follow-up. [FreeTextEntry3] : Severiano Francis MD FACS\par Chief of Surgical Oncology\par \par  [DrPhillip  ___] : Dr. AN

## 2019-09-30 NOTE — PHYSICAL EXAM
[Normal] : well developed, well nourished, in no acute distress [de-identified] : healing right breast incision with no evidence of infection ; small seroma

## 2020-01-15 ENCOUNTER — APPOINTMENT (OUTPATIENT)
Dept: ULTRASOUND IMAGING | Facility: IMAGING CENTER | Age: 60
End: 2020-01-15

## 2020-01-20 ENCOUNTER — APPOINTMENT (OUTPATIENT)
Dept: SURGICAL ONCOLOGY | Facility: CLINIC | Age: 60
End: 2020-01-20
Payer: COMMERCIAL

## 2020-01-20 VITALS
HEART RATE: 84 BPM | BODY MASS INDEX: 34.39 KG/M2 | WEIGHT: 214 LBS | RESPIRATION RATE: 15 BRPM | SYSTOLIC BLOOD PRESSURE: 122 MMHG | DIASTOLIC BLOOD PRESSURE: 80 MMHG | HEIGHT: 66 IN

## 2020-01-20 LAB
ALBUMIN SERPL ELPH-MCNC: 4.5 G/DL
ALP BLD-CCNC: 73 U/L
ALT SERPL-CCNC: 21 U/L
AST SERPL-CCNC: 17 U/L
BILIRUB DIRECT SERPL-MCNC: <0.1 MG/DL
BILIRUB INDIRECT SERPL-MCNC: >0.2 MG/DL
BILIRUB SERPL-MCNC: 0.3 MG/DL
CANCER AG27-29 SERPL-ACNC: 17.9 U/ML
CEA SERPL-MCNC: 1.8 NG/ML
PROT SERPL-MCNC: 7.8 G/DL

## 2020-01-20 PROCEDURE — 99214 OFFICE O/P EST MOD 30 MIN: CPT

## 2020-01-20 NOTE — HISTORY OF PRESENT ILLNESS
[de-identified] : 59 year old female presents for a 3 month follow up visit.  \par \par She is s/p left lumpectomy and left axillary sentinel lymph node biopsy on 8/16/17. Final pathology revealed infiltrating duct carcinoma, tumor size 2.5 x 1.5 cm, moderately differentiated, DCIS, cribriform and solid pattern, intermediate nuclear grade. 2/2 lymph nodes showing metastatic mammary duct carcinoma, negative margins. ER/UT(+), HER2(-). Tumor staging: pT2 pN1a. Oncotype 14. She is also s/p left axillary dissection on 9/13/17. Final pathology revealed 12 lymph nodes NEGATIVE for carcinoma, negative margins. She completed Radiation Therapy with Dr. Aleman on 1/5/18. No chemotherapy.\par \par On Arimidex 1mg daily and states that she is experiencing bilateral knee and hand discomfort. She had noticed some difficulty with movement of her Right thumb which she is concerned about. She is also noticing problems with her libido.\par \par B/L mammogram/sonogram 7/2019- Newly seen mildly suspicious 5 mm mass in right 11:00 axis, 4cmfn for which US guided core bx recommended. Negative mammogram.  BIRADS 4A\par \par S/p Right breast core bx 8/1/2019- Path: LCIS\par \par She is s/p Right MARIBEL  localized lumpectomy for LCIS on 9/18/19.  Final pathology revealed LCIS, sclerosing adenosis, biopsy site changes, pseudo-lactational change, negative margins. \par \par Bloodwork 1/2020:\par CEA: 1.8 ng/mL\par CA 27.29: 17.9 U/mL\par LFTs WNL\par \par  Denies palpable breast masses or nipple discharge.  Today she is with c/o right anterior thigh pain x 2 months.  She states she has herniated discs and will need to f/u with an orthopedist.

## 2020-01-20 NOTE — ASSESSMENT
[FreeTextEntry1] : IMP:\par s/p Right MARIBEL  localized lumpectomy for LCIS on 9/18/19.  Final pathology revealed LCIS, sclerosing adenosis, biopsy site changes, pseudo-lactational change, negative margins. \par Hx left infiltrating ductal carcinoma and DCIS 8/2017; s/p RT\par On Arimidex 1 mg daily\par \par PLAN:\par RTO 6 months with BW\par Bilateral mammogram and sonogram July, 2020

## 2020-01-20 NOTE — PHYSICAL EXAM
[Normal] : supple, no neck mass and thyroid not enlarged [Normal Supraclavicular Lymph Nodes] : normal supraclavicular lymph nodes [Normal Axillary Lymph Nodes] : normal axillary lymph nodes [Normal Neck Lymph Nodes] : normal neck lymph nodes  [Normal] : oriented to person, place and time, with appropriate affect [de-identified] : healed bilateral incisions; no masses or nipple discharge bilaterally

## 2020-07-19 ENCOUNTER — RX RENEWAL (OUTPATIENT)
Age: 60
End: 2020-07-19

## 2020-07-19 LAB
ALBUMIN SERPL ELPH-MCNC: 4.4 G/DL
ALP BLD-CCNC: 68 U/L
ALT SERPL-CCNC: 13 U/L
AST SERPL-CCNC: 23 U/L
BILIRUB DIRECT SERPL-MCNC: 0.1 MG/DL
BILIRUB INDIRECT SERPL-MCNC: 0.2 MG/DL
BILIRUB SERPL-MCNC: 0.3 MG/DL
CEA SERPL-MCNC: 2 NG/ML
PROT SERPL-MCNC: 7.5 G/DL

## 2020-07-20 ENCOUNTER — APPOINTMENT (OUTPATIENT)
Dept: SURGICAL ONCOLOGY | Facility: CLINIC | Age: 60
End: 2020-07-20
Payer: COMMERCIAL

## 2020-07-20 VITALS
DIASTOLIC BLOOD PRESSURE: 83 MMHG | HEIGHT: 66 IN | WEIGHT: 210 LBS | HEART RATE: 66 BPM | RESPIRATION RATE: 15 BRPM | SYSTOLIC BLOOD PRESSURE: 124 MMHG | BODY MASS INDEX: 33.75 KG/M2 | OXYGEN SATURATION: 96 %

## 2020-07-20 LAB — CANCER AG27-29 SERPL-ACNC: 17 U/ML

## 2020-07-20 PROCEDURE — 99214 OFFICE O/P EST MOD 30 MIN: CPT

## 2020-07-20 NOTE — PHYSICAL EXAM
[Normal] : supple, no neck mass and thyroid not enlarged [Normal Neck Lymph Nodes] : normal neck lymph nodes  [Normal Axillary Lymph Nodes] : normal axillary lymph nodes [Normal Supraclavicular Lymph Nodes] : normal supraclavicular lymph nodes [Normal] : grossly intact [de-identified] : healed bilateral incisions; no masses or nipple discharge bilaterally

## 2020-07-20 NOTE — HISTORY OF PRESENT ILLNESS
[de-identified] : 60 year old female presents for a 6 month follow up visit.  \par \par She is s/p left lumpectomy and left axillary sentinel lymph node biopsy on 8/16/17. Final pathology revealed infiltrating duct carcinoma, tumor size 2.5 x 1.5 cm, moderately differentiated, DCIS, cribriform and solid pattern, intermediate nuclear grade. 2/2 lymph nodes showing metastatic mammary duct carcinoma, negative margins. ER/OK(+), HER2(-). Tumor staging: pT2 pN1a. Oncotype 14. She is also s/p left axillary dissection on 9/13/17. Final pathology revealed 12 lymph nodes NEGATIVE for carcinoma, negative margins. She completed Radiation Therapy with Dr. Aleman on 1/5/18. No chemotherapy.\par \par On Arimidex 1mg daily and states that she is experiencing bilateral knee and hand discomfort. She had noticed some difficulty with movement of her Right thumb which she is concerned about. She is also noticing problems with her libido.\par \par B/L mammogram/sonogram 7/2019- Newly seen mildly suspicious 5 mm mass in right 11:00 axis, 4cmfn for which US guided core bx recommended. Negative mammogram.  BIRADS 4A\par \par S/p Right breast core bx 8/1/2019- Path: LCIS\par \par She is s/p Right MARIBEL  localized lumpectomy for LCIS on 9/18/19.  Final pathology revealed LCIS, sclerosing adenosis, biopsy site changes, pseudo-lactational change, negative margins. \par \par Bloodwork 1/2020:\par CEA: 1.8 ng/mL\par CA 27.29: 17.9 U/mL\par LFTs WNL\par \par  Denies palpable breast masses or nipple discharge.

## 2020-07-24 ENCOUNTER — RESULT REVIEW (OUTPATIENT)
Age: 60
End: 2020-07-24

## 2020-07-24 ENCOUNTER — APPOINTMENT (OUTPATIENT)
Dept: MAMMOGRAPHY | Facility: IMAGING CENTER | Age: 60
End: 2020-07-24
Payer: COMMERCIAL

## 2020-07-24 ENCOUNTER — OUTPATIENT (OUTPATIENT)
Dept: OUTPATIENT SERVICES | Facility: HOSPITAL | Age: 60
LOS: 1 days | End: 2020-07-24
Payer: COMMERCIAL

## 2020-07-24 ENCOUNTER — APPOINTMENT (OUTPATIENT)
Dept: ULTRASOUND IMAGING | Facility: IMAGING CENTER | Age: 60
End: 2020-07-24
Payer: COMMERCIAL

## 2020-07-24 DIAGNOSIS — C50.912 MALIGNANT NEOPLASM OF UNSPECIFIED SITE OF LEFT FEMALE BREAST: ICD-10-CM

## 2020-07-24 DIAGNOSIS — Z98.890 OTHER SPECIFIED POSTPROCEDURAL STATES: Chronic | ICD-10-CM

## 2020-07-24 PROCEDURE — 77066 DX MAMMO INCL CAD BI: CPT | Mod: 26

## 2020-07-24 PROCEDURE — 76641 ULTRASOUND BREAST COMPLETE: CPT

## 2020-07-24 PROCEDURE — G0279: CPT

## 2020-07-24 PROCEDURE — G0279: CPT | Mod: 26

## 2020-07-24 PROCEDURE — 76641 ULTRASOUND BREAST COMPLETE: CPT | Mod: 26,50

## 2020-07-24 PROCEDURE — 77066 DX MAMMO INCL CAD BI: CPT

## 2021-01-19 ENCOUNTER — LABORATORY RESULT (OUTPATIENT)
Age: 61
End: 2021-01-19

## 2021-01-25 ENCOUNTER — APPOINTMENT (OUTPATIENT)
Dept: SURGICAL ONCOLOGY | Facility: CLINIC | Age: 61
End: 2021-01-25
Payer: COMMERCIAL

## 2021-01-25 VITALS
DIASTOLIC BLOOD PRESSURE: 88 MMHG | HEART RATE: 65 BPM | SYSTOLIC BLOOD PRESSURE: 137 MMHG | RESPIRATION RATE: 15 BRPM | OXYGEN SATURATION: 96 % | WEIGHT: 210 LBS | BODY MASS INDEX: 33.75 KG/M2 | HEIGHT: 66 IN

## 2021-01-25 PROCEDURE — 99072 ADDL SUPL MATRL&STAF TM PHE: CPT

## 2021-01-25 PROCEDURE — 99214 OFFICE O/P EST MOD 30 MIN: CPT

## 2021-01-25 RX ORDER — FLUOXETINE HYDROCHLORIDE 20 MG/1
20 TABLET ORAL
Qty: 30 | Refills: 0 | Status: ACTIVE | COMMUNITY
Start: 2020-12-10

## 2021-01-25 NOTE — HISTORY OF PRESENT ILLNESS
[de-identified] : 60 year old female presents for a 6 month follow up visit.  \par \par She is s/p left lumpectomy and left axillary sentinel lymph node biopsy on 8/16/17. Final pathology revealed infiltrating duct carcinoma, tumor size 2.5 x 1.5 cm, moderately differentiated, DCIS, cribriform and solid pattern, intermediate nuclear grade. 2/2 lymph nodes showing metastatic mammary duct carcinoma, negative margins. ER/GA(+), HER2(-). Tumor staging: pT2 pN1a. Oncotype 14. She is also s/p left axillary dissection on 9/13/17. Final pathology revealed 12 lymph nodes NEGATIVE for carcinoma, negative margins. She completed Radiation Therapy with Dr. Aleman on 1/5/18. No chemotherapy.\par \par On Arimidex 1mg daily. \par \par B/L mammogram/sonogram 7/2019- Newly seen mildly suspicious 5 mm mass in right 11:00 axis, 4cmfn for which US guided core bx recommended. Negative mammogram.  BIRADS 4A;   S/p Right breast core bx 8/1/2019- Path: LCIS\par \par She is s/p Right MARIBEL  localized lumpectomy for LCIS on 9/18/19.  Final pathology revealed LCIS, sclerosing adenosis, biopsy site changes, pseudo-lactational change, negative margins. \par \par Bloodwork 1/2021:\par CEA: 1.9 ng/mL\par CA 27.29: 21.6 U/mL\par LFTs WNL\par \par B/L mammogram/sonogram 7/24/2020- No evidence of malignancy. BIRADS 2 \par \par  Denies palpable breast masses or nipple discharge.

## 2021-01-25 NOTE — ASSESSMENT
[FreeTextEntry1] : IMP:\par Hx LCIS s/p right lumpectomy 9/2019 \par Hx left infiltrating ductal carcinoma and DCIS 8/2017; s/p RT\par On Arimidex 1 mg daily\par B/L MMG/US 7/2020- BIRADS 2 \par \par PLAN:\par RTO 6 months with BW (ordered)\par Bilateral mammogram and sonogram July, 2021 (ordered)

## 2021-01-25 NOTE — PHYSICAL EXAM
[Normal] : supple, no neck mass and thyroid not enlarged [Normal Neck Lymph Nodes] : normal neck lymph nodes  [Normal Supraclavicular Lymph Nodes] : normal supraclavicular lymph nodes [Normal Axillary Lymph Nodes] : normal axillary lymph nodes [Normal] : oriented to person, place and time, with appropriate affect [de-identified] : healed bilateral incisions; no masses or nipple discharge bilaterally

## 2021-06-03 ENCOUNTER — APPOINTMENT (OUTPATIENT)
Dept: SURGICAL ONCOLOGY | Facility: CLINIC | Age: 61
End: 2021-06-03
Payer: COMMERCIAL

## 2021-06-03 VITALS
SYSTOLIC BLOOD PRESSURE: 119 MMHG | OXYGEN SATURATION: 95 % | RESPIRATION RATE: 16 BRPM | WEIGHT: 210 LBS | BODY MASS INDEX: 33.75 KG/M2 | DIASTOLIC BLOOD PRESSURE: 77 MMHG | HEART RATE: 74 BPM | HEIGHT: 66 IN

## 2021-06-03 PROCEDURE — 99214 OFFICE O/P EST MOD 30 MIN: CPT

## 2021-06-03 NOTE — ASSESSMENT
[FreeTextEntry1] : IMP:\par Hx LCIS s/p right lumpectomy 9/2019 \par Hx left infiltrating ductal carcinoma and DCIS 8/2017; s/p RT\par On Arimidex 1 mg daily\par B/L MMG/US 7/2020- BIRADS 2 \par - mild left arm lymphoedema ( jobst compression) \par \par PLAN:\par RTO 6 months with BW (ordered) August 2021\par Bilateral mammogram and sonogram July, 2021 (ordered)\par - Left arm jobst compression.

## 2021-06-03 NOTE — HISTORY OF PRESENT ILLNESS
[de-identified] : Amalia Matthews is a 61 year old female presents for a 6 month follow up visit.  \par \par Today 6/3/21 present with left arm swollen for about 6 weeks. Patient stated moving furniture 6 weeks and is when noticed the swollen in arm. \par \par She is s/p left lumpectomy and left axillary sentinel lymph node biopsy on 8/16/17. Final pathology revealed infiltrating duct carcinoma, tumor size 2.5 x 1.5 cm, moderately differentiated, DCIS, cribriform and solid pattern, intermediate nuclear grade. 2/2 lymph nodes showing metastatic mammary duct carcinoma, negative margins. ER/LA(+), HER2(-). Tumor staging: pT2 pN1a. Oncotype 14. She is also s/p left axillary dissection on 9/13/17. Final pathology revealed 12 lymph nodes NEGATIVE for carcinoma, negative margins. She completed Radiation Therapy with Dr. Aleman on 1/5/18. No chemotherapy.\par \par On Arimidex 1mg daily. \par \par B/L mammogram/sonogram 7/2019- Newly seen mildly suspicious 5 mm mass in right 11:00 axis, 4cmfn for which US guided core bx recommended. Negative mammogram.  BIRADS 4A;   S/p Right breast core bx 8/1/2019- Path: LCIS\par \par She is s/p Right MARIBEL  localized lumpectomy for LCIS on 9/18/19.  Final pathology revealed LCIS, sclerosing adenosis, biopsy site changes, pseudo-lactational change, negative margins. \par \par Bloodwork 1/2021:\par CEA: 1.9 ng/mL\par CA 27.29: 21.6 U/mL\par LFTs WNL\par \par B/L mammogram/sonogram 7/24/2020- No evidence of malignancy. BIRADS 2 \par \par  Denies palpable breast masses or nipple discharge.

## 2021-06-03 NOTE — PHYSICAL EXAM
[Normal] : supple, no neck mass and thyroid not enlarged [Normal Neck Lymph Nodes] : normal neck lymph nodes  [Normal Supraclavicular Lymph Nodes] : normal supraclavicular lymph nodes [Normal Axillary Lymph Nodes] : normal axillary lymph nodes [Normal] : oriented to person, place and time, with appropriate affect [de-identified] : no masses [de-identified] : mild left arm lymphedema but no cellulitis

## 2021-07-29 PROBLEM — D05.01 LOBULAR CARCINOMA IN SITU (LCIS) OF RIGHT BREAST: Status: ACTIVE | Noted: 2019-09-30

## 2021-07-30 ENCOUNTER — LABORATORY RESULT (OUTPATIENT)
Age: 61
End: 2021-07-30

## 2021-08-02 ENCOUNTER — RESULT REVIEW (OUTPATIENT)
Age: 61
End: 2021-08-02

## 2021-08-02 ENCOUNTER — APPOINTMENT (OUTPATIENT)
Dept: ULTRASOUND IMAGING | Facility: IMAGING CENTER | Age: 61
End: 2021-08-02
Payer: COMMERCIAL

## 2021-08-02 ENCOUNTER — APPOINTMENT (OUTPATIENT)
Dept: MAMMOGRAPHY | Facility: IMAGING CENTER | Age: 61
End: 2021-08-02
Payer: COMMERCIAL

## 2021-08-02 ENCOUNTER — OUTPATIENT (OUTPATIENT)
Dept: OUTPATIENT SERVICES | Facility: HOSPITAL | Age: 61
LOS: 1 days | End: 2021-08-02
Payer: COMMERCIAL

## 2021-08-02 DIAGNOSIS — C50.512 MALIGNANT NEOPLASM OF LOWER-OUTER QUADRANT OF LEFT FEMALE BREAST: ICD-10-CM

## 2021-08-02 DIAGNOSIS — Z98.890 OTHER SPECIFIED POSTPROCEDURAL STATES: Chronic | ICD-10-CM

## 2021-08-02 DIAGNOSIS — C50.912 MALIGNANT NEOPLASM OF UNSPECIFIED SITE OF LEFT FEMALE BREAST: ICD-10-CM

## 2021-08-02 PROCEDURE — 77066 DX MAMMO INCL CAD BI: CPT

## 2021-08-02 PROCEDURE — G0279: CPT

## 2021-08-02 PROCEDURE — 76641 ULTRASOUND BREAST COMPLETE: CPT | Mod: 26,50

## 2021-08-02 PROCEDURE — G0279: CPT | Mod: 26

## 2021-08-02 PROCEDURE — 77066 DX MAMMO INCL CAD BI: CPT | Mod: 26

## 2021-08-02 PROCEDURE — 76641 ULTRASOUND BREAST COMPLETE: CPT

## 2021-08-05 ENCOUNTER — APPOINTMENT (OUTPATIENT)
Dept: SURGICAL ONCOLOGY | Facility: CLINIC | Age: 61
End: 2021-08-05
Payer: COMMERCIAL

## 2021-08-05 VITALS
BODY MASS INDEX: 34.99 KG/M2 | RESPIRATION RATE: 16 BRPM | HEART RATE: 70 BPM | OXYGEN SATURATION: 98 % | SYSTOLIC BLOOD PRESSURE: 128 MMHG | WEIGHT: 210 LBS | DIASTOLIC BLOOD PRESSURE: 84 MMHG | HEIGHT: 65 IN

## 2021-08-05 DIAGNOSIS — D05.01 LOBULAR CARCINOMA IN SITU OF RIGHT BREAST: ICD-10-CM

## 2021-08-05 PROCEDURE — 99214 OFFICE O/P EST MOD 30 MIN: CPT

## 2021-08-05 NOTE — ASSESSMENT
[FreeTextEntry1] : IMP:\par Hx LCIS s/p right lumpectomy 9/2019 \par Hx left infiltrating ductal carcinoma and DCIS 8/2017; s/p RT\par On Arimidex 1 mg daily\par B/L MMG/US 7/2020- BIRADS 2 \par - mild left arm lymphoedema ( jobst compression) \par \par PLAN:\par RTO 6 months with BW\par Bilateral mammogram and sonogram August 2022\par - Left arm jobst compression PRN

## 2021-08-05 NOTE — HISTORY OF PRESENT ILLNESS
[de-identified] : Amalia Matthews is a 61 year old female presents for a follow up visit.  \par \par Today 6/3/21 present with left arm swollen for about 6 weeks. Patient stated moving furniture 6 weeks and is when noticed the swollen in arm. \par \par She is s/p left lumpectomy and left axillary sentinel lymph node biopsy on 8/16/17. Final pathology revealed infiltrating duct carcinoma, tumor size 2.5 x 1.5 cm, moderately differentiated, DCIS, cribriform and solid pattern, intermediate nuclear grade. 2/2 lymph nodes showing metastatic mammary duct carcinoma, negative margins. ER/WA(+), HER2(-). Tumor staging: pT2 pN1a. Oncotype 14. She is also s/p left axillary dissection on 9/13/17. Final pathology revealed 12 lymph nodes NEGATIVE for carcinoma, negative margins. She completed Radiation Therapy with Dr. Aleman on 1/5/18. No chemotherapy.\par \par On Arimidex 1mg daily. \par \par B/L mammogram/sonogram 7/2019- Newly seen mildly suspicious 5 mm mass in right 11:00 axis, 4cmfn for which US guided core bx recommended. Negative mammogram.  BIRADS 4A;   S/p Right breast core bx 8/1/2019- Path: LCIS\par \par She is s/p Right MARIBEL  localized lumpectomy for LCIS on 9/18/19.  Final pathology revealed LCIS, sclerosing adenosis, biopsy site changes, pseudo-lactational change, negative margins. \par \par Bloodwork 7/30/21:\par CEA: 2.4 ng/mL\par CA 27.29: 20.9 U/mL\par LFTs WNL\par \par \par B/L mammo/sono 8/2/2021: No evidence of malignancy. No significant change BIRADS 2 \par \par  Denies palpable breast masses or nipple discharge.

## 2021-08-05 NOTE — PHYSICAL EXAM
[Normal] : supple, no neck mass and thyroid not enlarged [Normal Neck Lymph Nodes] : normal neck lymph nodes  [Normal Supraclavicular Lymph Nodes] : normal supraclavicular lymph nodes [Normal Axillary Lymph Nodes] : normal axillary lymph nodes [Normal] : oriented to person, place and time, with appropriate affect [de-identified] : mild fibrocysti changes but no dominant masses [de-identified] : mild lymphedema left arm

## 2021-12-18 ENCOUNTER — RESULT REVIEW (OUTPATIENT)
Age: 61
End: 2021-12-18

## 2022-01-31 NOTE — ASU DISCHARGE PLAN (ADULT/PEDIATRIC). - =======================================================================
No new care gaps identified.  Powered by [employer]. Reference number: [id number].   1/31/2022 10:38:33 AM CST  
Statement Selected

## 2022-02-02 ENCOUNTER — LABORATORY RESULT (OUTPATIENT)
Age: 62
End: 2022-02-02

## 2022-02-07 ENCOUNTER — APPOINTMENT (OUTPATIENT)
Dept: SURGICAL ONCOLOGY | Facility: CLINIC | Age: 62
End: 2022-02-07
Payer: COMMERCIAL

## 2022-02-07 VITALS
SYSTOLIC BLOOD PRESSURE: 112 MMHG | DIASTOLIC BLOOD PRESSURE: 75 MMHG | BODY MASS INDEX: 38.64 KG/M2 | RESPIRATION RATE: 16 BRPM | HEIGHT: 62 IN | HEART RATE: 78 BPM | WEIGHT: 210 LBS | OXYGEN SATURATION: 95 % | TEMPERATURE: 98.4 F

## 2022-02-07 PROCEDURE — 99214 OFFICE O/P EST MOD 30 MIN: CPT

## 2022-02-07 RX ORDER — ESTRADIOL 0.1 MG/G
0.1 CREAM VAGINAL
Qty: 42 | Refills: 0 | Status: ACTIVE | COMMUNITY
Start: 2021-12-18

## 2022-02-07 NOTE — ASSESSMENT
[FreeTextEntry1] : IMP:\par Hx LCIS s/p right lumpectomy 9/2019 \par Hx left infiltrating ductal carcinoma and DCIS 8/2017; s/p RT\par On Arimidex 1 mg daily\par B/L MMG/US 8/2021- BIRADS 2 \par BW 2/2022- WNL\par Mild left arm lymphedema \par \par PLAN:\par RTO 6 months with BW (next BW 8/2022- ordered) \par Bilateral mammogram and sonogram August 2022 (ordered)\par

## 2022-02-07 NOTE — PHYSICAL EXAM
[Normal] : supple, no neck mass and thyroid not enlarged [Normal Supraclavicular Lymph Nodes] : normal supraclavicular lymph nodes [Normal Axillary Lymph Nodes] : normal axillary lymph nodes [Normal] : oriented to person, place and time, with appropriate affect [de-identified] : healed bilateral incisions; no masses or nipple discharge bilaterally

## 2022-02-07 NOTE — REASON FOR VISIT
[Follow-Up Visit] : a follow-up visit for [Breast Cancer] : breast cancer [FreeTextEntry2] : Hx Left IDC and Right LCIS

## 2022-02-07 NOTE — HISTORY OF PRESENT ILLNESS
[de-identified] : Amalia Matthews is a 61 year old female presents for a 6 month follow up visit.  \par \par She is s/p left lumpectomy and left axillary sentinel lymph node biopsy on 8/16/17. Final pathology revealed infiltrating duct carcinoma, tumor size 2.5 x 1.5 cm, moderately differentiated, DCIS, cribriform and solid pattern, intermediate nuclear grade. 2/2 lymph nodes showing metastatic mammary duct carcinoma, negative margins. ER/[place](+), HER2(-). Tumor staging: pT2 pN1a. Oncotype 14. She is also s/p left axillary dissection on 9/13/17. Final pathology revealed 12 lymph nodes NEGATIVE for carcinoma, negative margins. She completed Radiation Therapy with Dr. Aleman on 1/5/18. No chemotherapy.  On Arimidex 1 mg daily. \par \par B/L mammogram/sonogram 7/2019- Newly seen mildly suspicious 5 mm mass in right 11:00 axis, 4cmfn for which US guided core bx recommended. Negative mammogram.  BIRADS 4A;   S/p Right breast core bx 8/1/2019- Path: LCIS\par \par She is s/p Right MARIBEL  localized lumpectomy for LCIS on 9/18/19.  Final pathology revealed LCIS, sclerosing adenosis, biopsy site changes, pseudo-lactational change, negative margins. \par \par Bloodwork 2/2022: \par CEA: 1.8 ng/mL\par CA 27.29: 20.4 U/mL\par LFTs WNL\par \par B/L mammo/sono 8/2/2021: No evidence of malignancy. No significant change BIRADS 2 \par \par On  6/3/21 pt had c/o eft arm swelling x  6 weeks. \par \par  Denies palpable breast masses or nipple discharge.

## 2022-08-09 ENCOUNTER — RESULT REVIEW (OUTPATIENT)
Age: 62
End: 2022-08-09

## 2022-08-09 ENCOUNTER — APPOINTMENT (OUTPATIENT)
Dept: MAMMOGRAPHY | Facility: IMAGING CENTER | Age: 62
End: 2022-08-09

## 2022-08-09 ENCOUNTER — OUTPATIENT (OUTPATIENT)
Dept: OUTPATIENT SERVICES | Facility: HOSPITAL | Age: 62
LOS: 1 days | End: 2022-08-09
Payer: COMMERCIAL

## 2022-08-09 ENCOUNTER — APPOINTMENT (OUTPATIENT)
Dept: ULTRASOUND IMAGING | Facility: IMAGING CENTER | Age: 62
End: 2022-08-09

## 2022-08-09 DIAGNOSIS — Z00.8 ENCOUNTER FOR OTHER GENERAL EXAMINATION: ICD-10-CM

## 2022-08-09 DIAGNOSIS — Z98.890 OTHER SPECIFIED POSTPROCEDURAL STATES: Chronic | ICD-10-CM

## 2022-08-09 DIAGNOSIS — Z85.3 PERSONAL HISTORY OF MALIGNANT NEOPLASM OF BREAST: ICD-10-CM

## 2022-08-09 PROCEDURE — 77066 DX MAMMO INCL CAD BI: CPT | Mod: 26

## 2022-08-09 PROCEDURE — 76641 ULTRASOUND BREAST COMPLETE: CPT | Mod: 26,50

## 2022-08-09 PROCEDURE — G0279: CPT

## 2022-08-09 PROCEDURE — G0279: CPT | Mod: 26

## 2022-08-09 PROCEDURE — 76641 ULTRASOUND BREAST COMPLETE: CPT

## 2022-08-09 PROCEDURE — 77066 DX MAMMO INCL CAD BI: CPT

## 2022-08-14 LAB
ALBUMIN SERPL ELPH-MCNC: 4.6 G/DL
ALP BLD-CCNC: 67 U/L
ALT SERPL-CCNC: 16 U/L
AST SERPL-CCNC: 17 U/L
BILIRUB DIRECT SERPL-MCNC: <0.1 MG/DL
BILIRUB INDIRECT SERPL-MCNC: >0.3 MG/DL
BILIRUB SERPL-MCNC: 0.4 MG/DL
CANCER AG27-29 SERPL-ACNC: 20.7 U/ML
CEA SERPL-MCNC: 2.1 NG/ML
PROT SERPL-MCNC: 7.7 G/DL

## 2022-08-18 ENCOUNTER — APPOINTMENT (OUTPATIENT)
Dept: SURGICAL ONCOLOGY | Facility: CLINIC | Age: 62
End: 2022-08-18

## 2022-08-18 VITALS
SYSTOLIC BLOOD PRESSURE: 125 MMHG | BODY MASS INDEX: 34.99 KG/M2 | WEIGHT: 210 LBS | HEART RATE: 77 BPM | HEIGHT: 65 IN | OXYGEN SATURATION: 96 % | RESPIRATION RATE: 16 BRPM | TEMPERATURE: 97.3 F | DIASTOLIC BLOOD PRESSURE: 85 MMHG

## 2022-08-18 PROCEDURE — 99214 OFFICE O/P EST MOD 30 MIN: CPT

## 2022-08-18 NOTE — PHYSICAL EXAM
[Normal] : supple, no neck mass and thyroid not enlarged [Normal Supraclavicular Lymph Nodes] : normal supraclavicular lymph nodes [Normal Axillary Lymph Nodes] : normal axillary lymph nodes [Normal] : oriented to person, place and time, with appropriate affect [de-identified] : healed bilateral incisions; no masses or nipple discharge bilaterally

## 2022-08-18 NOTE — ASSESSMENT
[FreeTextEntry1] : IMP:\par Hx LCIS s/p right lumpectomy 9/2019 \par Hx left infiltrating ductal carcinoma and DCIS 8/2017; s/p RT\par LISA on Arimidex 1 mg daily\par \par Labs 8/2022 - WNL \par mammo/sono 8/2022- BIRADS 2\par \par Mild left arm lymphedema \par \par \par PLAN:\par RTO 6 months with BW \par Bilateral mammogram and sonogram August 2023\par

## 2022-08-18 NOTE — HISTORY OF PRESENT ILLNESS
[de-identified] : Amalia Matthews is a 61 year old female presents for a 6 month follow up visit.  \par \par She is s/p left lumpectomy and left axillary sentinel lymph node biopsy on 8/16/17. Final pathology revealed infiltrating duct carcinoma, tumor size 2.5 x 1.5 cm, moderately differentiated, DCIS, cribriform and solid pattern, intermediate nuclear grade. 2/2 lymph nodes showing metastatic mammary duct carcinoma, negative margins. ER/[place](+), HER2(-). Tumor staging: pT2 pN1a. Oncotype 14. She is also s/p left axillary dissection on 9/13/17. Final pathology revealed 12 lymph nodes NEGATIVE for carcinoma, negative margins. She completed Radiation Therapy with Dr. Aleman on 1/5/18. No chemotherapy.  On Arimidex 1 mg daily. \par \par B/L mammogram/sonogram 7/2019- Newly seen mildly suspicious 5 mm mass in right 11:00 axis, 4cmfn for which US guided core bx recommended. Negative mammogram.  BIRADS 4A;   S/p Right breast core bx 8/1/2019- Path: LCIS\par \par She is s/p Right MARIBEL  localized lumpectomy for LCIS on 9/18/19.  Final pathology revealed LCIS, sclerosing adenosis, biopsy site changes, pseudolactational change, negative margins. \par \par Labs 8/2022 WNL\par B/L mammo/sono 8/2022- BIRADS 2 \par \par \par \par

## 2023-01-30 PROBLEM — Z85.3 HISTORY OF BREAST CANCER IN FEMALE: Status: ACTIVE | Noted: 2022-02-06

## 2023-02-05 LAB
ALBUMIN SERPL ELPH-MCNC: 4.7 G/DL
ALP BLD-CCNC: 66 U/L
ALT SERPL-CCNC: 21 U/L
AST SERPL-CCNC: 23 U/L
BILIRUB DIRECT SERPL-MCNC: <0.1 MG/DL
BILIRUB INDIRECT SERPL-MCNC: >0.1 MG/DL
BILIRUB SERPL-MCNC: 0.2 MG/DL
CANCER AG27-29 SERPL-ACNC: 22.6 U/ML
CEA SERPL-MCNC: 2 NG/ML
PROT SERPL-MCNC: 7.9 G/DL

## 2023-02-06 ENCOUNTER — APPOINTMENT (OUTPATIENT)
Dept: SURGICAL ONCOLOGY | Facility: CLINIC | Age: 63
End: 2023-02-06
Payer: COMMERCIAL

## 2023-02-06 VITALS
WEIGHT: 214 LBS | OXYGEN SATURATION: 98 % | BODY MASS INDEX: 35.65 KG/M2 | HEIGHT: 65 IN | DIASTOLIC BLOOD PRESSURE: 81 MMHG | SYSTOLIC BLOOD PRESSURE: 125 MMHG | HEART RATE: 88 BPM | RESPIRATION RATE: 16 BRPM

## 2023-02-06 DIAGNOSIS — Z85.3 PERSONAL HISTORY OF MALIGNANT NEOPLASM OF BREAST: ICD-10-CM

## 2023-02-06 PROCEDURE — 99214 OFFICE O/P EST MOD 30 MIN: CPT

## 2023-02-06 NOTE — PHYSICAL EXAM
[Normal] : supple, no neck mass and thyroid not enlarged [Normal Supraclavicular Lymph Nodes] : normal supraclavicular lymph nodes [Normal Axillary Lymph Nodes] : normal axillary lymph nodes [Normal] : oriented to person, place and time, with appropriate affect [de-identified] : healed bilateral incisions; no masses or nipple discharge bilaterally

## 2023-02-06 NOTE — HISTORY OF PRESENT ILLNESS
[de-identified] : Ms. ELIAS RIBERA is a 62 year old woman here today for a follow-up visit \par \par She is s/p left lumpectomy and left axillary sentinel lymph node biopsy on 8/16/17. Final pathology revealed infiltrating duct carcinoma, tumor size 2.5 x 1.5 cm, moderately differentiated, DCIS, cribriform and solid pattern, intermediate nuclear grade. 2/2 lymph nodes showing metastatic mammary duct carcinoma, negative margins. ER/[place](+), HER2(-). Tumor staging: pT2 pN1a. Oncotype 14. \par She is also s/p left axillary dissection on 9/13/17. Final pathology revealed 12 lymph nodes NEGATIVE for carcinoma, negative margins. \par \par She completed Radiation Therapy with Dr. Aleman on 1/5/18. No chemotherapy.  \par On Arimidex 1 mg daily. \par \par She is s/p Right MARIBEL  localized lumpectomy for LCIS on 9/18/19.  Final pathology revealed LCIS, sclerosing adenosis, biopsy site changes, pseudolactational change, negative margins. \par \par \par B/L mammo/sono 8/2022- BIRADS 2 \par Labs 2/2023 WNL \par Today she denies palpable breast masses, nipple discharge, skin changes, inversion or breast pain. \par

## 2023-02-06 NOTE — ASSESSMENT
[FreeTextEntry1] : IMP:\par Hx LCIS s/p right lumpectomy 9/2019 \par Hx left infiltrating ductal carcinoma and DCIS 8/2017; s/p RT\par \par on Arimidex 1 mg daily\par \par mammo/sono 8/2022- BIRADS 2\par labs 2/2023\par \par Mild left arm lymphedema, stable \par \par PLAN:\par RTO 6 months with BW \par Bilateral mammogram and sonogram 8/2023\par

## 2023-04-21 ENCOUNTER — APPOINTMENT (OUTPATIENT)
Dept: RADIOLOGY | Facility: IMAGING CENTER | Age: 63
End: 2023-04-21
Payer: COMMERCIAL

## 2023-04-21 ENCOUNTER — OUTPATIENT (OUTPATIENT)
Dept: OUTPATIENT SERVICES | Facility: HOSPITAL | Age: 63
LOS: 1 days | End: 2023-04-21
Payer: COMMERCIAL

## 2023-04-21 DIAGNOSIS — Z98.890 OTHER SPECIFIED POSTPROCEDURAL STATES: Chronic | ICD-10-CM

## 2023-04-21 DIAGNOSIS — Z00.8 ENCOUNTER FOR OTHER GENERAL EXAMINATION: ICD-10-CM

## 2023-04-21 PROCEDURE — 77080 DXA BONE DENSITY AXIAL: CPT

## 2023-04-21 PROCEDURE — 77080 DXA BONE DENSITY AXIAL: CPT | Mod: 26

## 2023-08-14 LAB
ALBUMIN SERPL ELPH-MCNC: 4.4 G/DL
ALP BLD-CCNC: 66 U/L
ALT SERPL-CCNC: 15 U/L
AST SERPL-CCNC: 20 U/L
BILIRUB DIRECT SERPL-MCNC: 0.1 MG/DL
BILIRUB INDIRECT SERPL-MCNC: 0.2 MG/DL
BILIRUB SERPL-MCNC: 0.3 MG/DL
CANCER AG27-29 SERPL-ACNC: 20.7 U/ML
CEA SERPL-MCNC: 1.9 NG/ML
PROT SERPL-MCNC: 7.9 G/DL

## 2023-08-15 ENCOUNTER — OUTPATIENT (OUTPATIENT)
Dept: OUTPATIENT SERVICES | Facility: HOSPITAL | Age: 63
LOS: 1 days | End: 2023-08-15
Payer: COMMERCIAL

## 2023-08-15 ENCOUNTER — RESULT REVIEW (OUTPATIENT)
Age: 63
End: 2023-08-15

## 2023-08-15 ENCOUNTER — APPOINTMENT (OUTPATIENT)
Dept: MAMMOGRAPHY | Facility: IMAGING CENTER | Age: 63
End: 2023-08-15
Payer: COMMERCIAL

## 2023-08-15 ENCOUNTER — APPOINTMENT (OUTPATIENT)
Dept: ULTRASOUND IMAGING | Facility: IMAGING CENTER | Age: 63
End: 2023-08-15
Payer: COMMERCIAL

## 2023-08-15 DIAGNOSIS — Z98.890 OTHER SPECIFIED POSTPROCEDURAL STATES: Chronic | ICD-10-CM

## 2023-08-15 DIAGNOSIS — Z85.3 PERSONAL HISTORY OF MALIGNANT NEOPLASM OF BREAST: ICD-10-CM

## 2023-08-15 PROCEDURE — 77067 SCR MAMMO BI INCL CAD: CPT | Mod: 26

## 2023-08-15 PROCEDURE — 76641 ULTRASOUND BREAST COMPLETE: CPT | Mod: 26,50

## 2023-08-15 PROCEDURE — 77063 BREAST TOMOSYNTHESIS BI: CPT | Mod: 26

## 2023-08-15 PROCEDURE — 77067 SCR MAMMO BI INCL CAD: CPT

## 2023-08-15 PROCEDURE — 77063 BREAST TOMOSYNTHESIS BI: CPT

## 2023-08-15 PROCEDURE — 76641 ULTRASOUND BREAST COMPLETE: CPT

## 2023-08-17 ENCOUNTER — APPOINTMENT (OUTPATIENT)
Dept: SURGICAL ONCOLOGY | Facility: CLINIC | Age: 63
End: 2023-08-17
Payer: COMMERCIAL

## 2023-08-17 VITALS
RESPIRATION RATE: 16 BRPM | BODY MASS INDEX: 35.49 KG/M2 | DIASTOLIC BLOOD PRESSURE: 79 MMHG | OXYGEN SATURATION: 97 % | HEART RATE: 80 BPM | SYSTOLIC BLOOD PRESSURE: 115 MMHG | WEIGHT: 213 LBS | HEIGHT: 65 IN

## 2023-08-17 DIAGNOSIS — Z08 ENCOUNTER FOR FOLLOW-UP EXAMINATION AFTER COMPLETED TREATMENT FOR MALIGNANT NEOPLASM: ICD-10-CM

## 2023-08-17 DIAGNOSIS — Z85.3 ENCOUNTER FOR FOLLOW-UP EXAMINATION AFTER COMPLETED TREATMENT FOR MALIGNANT NEOPLASM: ICD-10-CM

## 2023-08-17 PROCEDURE — 99214 OFFICE O/P EST MOD 30 MIN: CPT

## 2023-08-17 NOTE — HISTORY OF PRESENT ILLNESS
[de-identified] : Ms. ELIAS RIBERA is a 62 year old woman here today for a follow-up visit   She is s/p left lumpectomy and left axillary sentinel lymph node biopsy on 8/16/17. Final pathology revealed infiltrating duct carcinoma, tumor size 2.5 x 1.5 cm, moderately differentiated, DCIS, cribriform and solid pattern, intermediate nuclear grade. 2/2 lymph nodes showing metastatic mammary duct carcinoma, negative margins. ER/[place](+), HER2(-). Tumor staging: pT2 pN1a. Oncotype 14.  She is also s/p left axillary dissection on 9/13/17. Final pathology revealed 12 lymph nodes NEGATIVE for carcinoma, negative margins.   She completed Radiation Therapy with Dr. Aleman on 1/5/18. No chemotherapy.   On Arimidex 1 mg daily.   She is s/p Right MARIBEL  localized lumpectomy for LCIS on 9/18/19.  Final pathology revealed LCIS, sclerosing adenosis, biopsy site changes, pseudolactational change, negative margins.    B/L mammo/sono 8/15/2023- no evidence of malignancy. BIRADS 2  Labs 8/2023 WNL  Today she denies palpable breast masses, nipple discharge, skin changes, inversion or breast pain.

## 2023-08-17 NOTE — PHYSICAL EXAM
[Normal] : supple, no neck mass and thyroid not enlarged [Normal Supraclavicular Lymph Nodes] : normal supraclavicular lymph nodes [Normal Axillary Lymph Nodes] : normal axillary lymph nodes [Normal] : oriented to person, place and time, with appropriate affect [de-identified] : healed bilateral incisions; no masses or nipple discharge bilaterally

## 2023-08-17 NOTE — ASSESSMENT
[FreeTextEntry1] : IMP: LISA Hx LCIS s/p right lumpectomy 9/2019  Hx left infiltrating ductal carcinoma and DCIS 8/2017; s/p RT  on Arimidex 1 mg daily  mammo/sono 8/2023- BIRADS 2 labs 8/2023: WNL  Plan: RTO q6 months with BW  Bilateral mammogram and sonogram 8/2024

## 2024-02-05 ENCOUNTER — RX RENEWAL (OUTPATIENT)
Age: 64
End: 2024-02-05

## 2024-02-05 RX ORDER — ANASTROZOLE TABLETS 1 MG/1
1 TABLET ORAL
Qty: 90 | Refills: 3 | Status: ACTIVE | COMMUNITY
Start: 2017-12-27 | End: 1900-01-01

## 2024-02-21 LAB
ALBUMIN SERPL ELPH-MCNC: 4.4 G/DL
ALP BLD-CCNC: 63 U/L
ALT SERPL-CCNC: 23 U/L
AST SERPL-CCNC: 24 U/L
BILIRUB DIRECT SERPL-MCNC: 0.1 MG/DL
BILIRUB INDIRECT SERPL-MCNC: 0.2 MG/DL
BILIRUB SERPL-MCNC: 0.2 MG/DL
CANCER AG27-29 SERPL-ACNC: 13.9 U/ML
CEA SERPL-MCNC: 1.6 NG/ML
PROT SERPL-MCNC: 7.6 G/DL

## 2024-02-26 ENCOUNTER — APPOINTMENT (OUTPATIENT)
Dept: SURGICAL ONCOLOGY | Facility: CLINIC | Age: 64
End: 2024-02-26
Payer: COMMERCIAL

## 2024-02-26 VITALS
HEART RATE: 87 BPM | RESPIRATION RATE: 16 BRPM | DIASTOLIC BLOOD PRESSURE: 80 MMHG | HEIGHT: 65 IN | WEIGHT: 213 LBS | OXYGEN SATURATION: 95 % | BODY MASS INDEX: 35.49 KG/M2 | SYSTOLIC BLOOD PRESSURE: 124 MMHG

## 2024-02-26 DIAGNOSIS — C50.512 MALIGNANT NEOPLASM OF LOWER-OUTER QUADRANT OF LEFT FEMALE BREAST: ICD-10-CM

## 2024-02-26 PROCEDURE — 99214 OFFICE O/P EST MOD 30 MIN: CPT

## 2024-02-26 NOTE — HISTORY OF PRESENT ILLNESS
[de-identified] : Ms. ELIAS RIBERA is a 63 year old woman here today for a follow-up visit   She is s/p left lumpectomy and left axillary sentinel lymph node biopsy on 8/16/17. Final pathology revealed infiltrating duct carcinoma, tumor size 2.5 x 1.5 cm, moderately differentiated, DCIS, cribriform and solid pattern, intermediate nuclear grade. 2/2 lymph nodes showing metastatic mammary duct carcinoma, negative margins. ER/[place](+), HER2(-). Tumor staging: pT2 pN1a. Oncotype 14.  She is also s/p left axillary dissection on 9/13/17. Final pathology revealed 12 lymph nodes NEGATIVE for carcinoma, negative margins.   She completed Radiation Therapy with Dr. Aleman on 1/5/18. No chemotherapy.   On Arimidex 1 mg daily.   She is s/p Right MARIBEL  localized lumpectomy for LCIS on 9/18/19.  Final pathology revealed LCIS, sclerosing adenosis, biopsy site changes, pseudo lactational change, negative margins.   B/L mammo/sono 8/15/2023- no evidence of malignancy. BIRADS 2  labs 2/2024 - WNL

## 2024-02-26 NOTE — ASSESSMENT
[FreeTextEntry1] : IMP: Hx LCIS s/p right lumpectomy 9/2019  Hx left infiltrating ductal carcinoma and DCIS 8/2017; s/p RT  on Arimidex 1 mg daily  mammo/sono 8/2023- BIRADS 2 labs 2/2024 - WNL   Plan: RTO Q6 months with BW  mammo/sono 8/2024

## 2024-02-26 NOTE — PHYSICAL EXAM
[Normal] : supple, no neck mass and thyroid not enlarged [Normal Supraclavicular Lymph Nodes] : normal supraclavicular lymph nodes [Normal Axillary Lymph Nodes] : normal axillary lymph nodes [Normal] : oriented to person, place and time, with appropriate affect [de-identified] : healed bilateral incisions; no masses or nipple discharge bilaterally

## 2024-08-19 ENCOUNTER — RESULT REVIEW (OUTPATIENT)
Age: 64
End: 2024-08-19

## 2024-08-19 ENCOUNTER — APPOINTMENT (OUTPATIENT)
Dept: ULTRASOUND IMAGING | Facility: IMAGING CENTER | Age: 64
End: 2024-08-19
Payer: COMMERCIAL

## 2024-08-19 ENCOUNTER — OUTPATIENT (OUTPATIENT)
Dept: OUTPATIENT SERVICES | Facility: HOSPITAL | Age: 64
LOS: 1 days | End: 2024-08-19
Payer: COMMERCIAL

## 2024-08-19 ENCOUNTER — APPOINTMENT (OUTPATIENT)
Dept: MAMMOGRAPHY | Facility: IMAGING CENTER | Age: 64
End: 2024-08-19
Payer: COMMERCIAL

## 2024-08-19 DIAGNOSIS — C50.512 MALIGNANT NEOPLASM OF LOWER-OUTER QUADRANT OF LEFT FEMALE BREAST: ICD-10-CM

## 2024-08-19 DIAGNOSIS — Z00.8 ENCOUNTER FOR OTHER GENERAL EXAMINATION: ICD-10-CM

## 2024-08-19 DIAGNOSIS — Z98.890 OTHER SPECIFIED POSTPROCEDURAL STATES: Chronic | ICD-10-CM

## 2024-08-19 PROCEDURE — 76641 ULTRASOUND BREAST COMPLETE: CPT | Mod: 26,50

## 2024-08-19 PROCEDURE — 77067 SCR MAMMO BI INCL CAD: CPT

## 2024-08-19 PROCEDURE — 77063 BREAST TOMOSYNTHESIS BI: CPT | Mod: 26

## 2024-08-19 PROCEDURE — 77063 BREAST TOMOSYNTHESIS BI: CPT

## 2024-08-19 PROCEDURE — 77067 SCR MAMMO BI INCL CAD: CPT | Mod: 26

## 2024-08-19 PROCEDURE — 76641 ULTRASOUND BREAST COMPLETE: CPT

## 2024-08-25 ENCOUNTER — NON-APPOINTMENT (OUTPATIENT)
Age: 64
End: 2024-08-25

## 2024-08-26 ENCOUNTER — APPOINTMENT (OUTPATIENT)
Dept: SURGICAL ONCOLOGY | Facility: CLINIC | Age: 64
End: 2024-08-26
Payer: COMMERCIAL

## 2024-08-26 VITALS
OXYGEN SATURATION: 97 % | WEIGHT: 213 LBS | DIASTOLIC BLOOD PRESSURE: 81 MMHG | HEART RATE: 70 BPM | RESPIRATION RATE: 17 BRPM | BODY MASS INDEX: 36.37 KG/M2 | SYSTOLIC BLOOD PRESSURE: 121 MMHG | HEIGHT: 64 IN

## 2024-08-26 DIAGNOSIS — C50.512 MALIGNANT NEOPLASM OF LOWER-OUTER QUADRANT OF LEFT FEMALE BREAST: ICD-10-CM

## 2024-08-26 DIAGNOSIS — Z85.3 PERSONAL HISTORY OF MALIGNANT NEOPLASM OF BREAST: ICD-10-CM

## 2024-08-26 PROCEDURE — 99214 OFFICE O/P EST MOD 30 MIN: CPT

## 2024-08-26 NOTE — HISTORY OF PRESENT ILLNESS
[de-identified] : Ms. ELIAS RIBERA is a 64-year-old woman here today for a follow-up visit   She is s/p left lumpectomy and left SLNB on 8/16/17. Final pathology revealed infiltrating duct carcinoma, tumor size 2.5 x 1.5 cm, moderately differentiated, DCIS, 2/2 lymph nodes showing metastatic mammary duct carcinoma, negative margins. ER+/[place]+/HER2-, pT2 pN1a.  Oncotype 14.  She is also s/p left axillary dissection on 9/13/17. Final pathology revealed 12 lymph nodes NEGATIVE for carcinoma, negative margins.   She completed Radiation Therapy with Dr. Aleman on 1/2018. On Arimidex 1 mg daily.   She is s/p Right MARIBEL  localized lumpectomy for LCIS on 9/18/19.  Final pathology revealed LCIS, negative margins.    labs 8/2024 - WNL  B/L mammo/sono 8/2024 - BIRADS 2

## 2024-08-26 NOTE — PHYSICAL EXAM
[Normal] : supple, no neck mass and thyroid not enlarged [Normal Supraclavicular Lymph Nodes] : normal supraclavicular lymph nodes [Normal Axillary Lymph Nodes] : normal axillary lymph nodes [Normal] : oriented to person, place and time, with appropriate affect [de-identified] : healed bilateral incisions; no masses or nipple discharge bilaterally

## 2024-08-26 NOTE — HISTORY OF PRESENT ILLNESS
[de-identified] : Ms. ELIAS RIBERA is a 64-year-old woman here today for a follow-up visit   She is s/p left lumpectomy and left SLNB on 8/16/17. Final pathology revealed infiltrating duct carcinoma, tumor size 2.5 x 1.5 cm, moderately differentiated, DCIS, 2/2 lymph nodes showing metastatic mammary duct carcinoma, negative margins. ER+/[place]+/HER2-, pT2 pN1a.  Oncotype 14.  She is also s/p left axillary dissection on 9/13/17. Final pathology revealed 12 lymph nodes NEGATIVE for carcinoma, negative margins.   She completed Radiation Therapy with Dr. Aleman on 1/2018. On Arimidex 1 mg daily.   She is s/p Right MARIBEL  localized lumpectomy for LCIS on 9/18/19.  Final pathology revealed LCIS, negative margins.    labs 8/2024 - WNL  B/L mammo/sono 8/2024 - BIRADS 2

## 2024-08-26 NOTE — PHYSICAL EXAM
[Normal] : supple, no neck mass and thyroid not enlarged [Normal Supraclavicular Lymph Nodes] : normal supraclavicular lymph nodes [Normal Axillary Lymph Nodes] : normal axillary lymph nodes [Normal] : oriented to person, place and time, with appropriate affect [de-identified] : healed bilateral incisions; no masses or nipple discharge bilaterally

## 2024-08-26 NOTE — PHYSICAL EXAM
[Normal] : supple, no neck mass and thyroid not enlarged [Normal Supraclavicular Lymph Nodes] : normal supraclavicular lymph nodes [Normal Axillary Lymph Nodes] : normal axillary lymph nodes [Normal] : oriented to person, place and time, with appropriate affect [de-identified] : healed bilateral incisions; no masses or nipple discharge bilaterally

## 2024-08-26 NOTE — ASSESSMENT
[FreeTextEntry1] : IMP: Hx LCIS s/p right lumpectomy 9/2019  Hx left infiltrating ductal carcinoma and DCIS 8/2017; s/p RT  ILSA on Arimidex 1 mg daily  labs 8/2024 - WNL  B/L mammo/sono 8/2024 - BIRADS 2   Plan: RTO Q6 months with BW  mammo/sono 8/2025

## 2024-08-26 NOTE — ASSESSMENT
[FreeTextEntry1] : IMP: Hx LCIS s/p right lumpectomy 9/2019  Hx left infiltrating ductal carcinoma and DCIS 8/2017; s/p RT  LISA on Arimidex 1 mg daily  labs 8/2024 - WNL  B/L mammo/sono 8/2024 - BIRADS 2   Plan: RTO Q6 months with BW  mammo/sono 8/2025

## 2024-08-26 NOTE — HISTORY OF PRESENT ILLNESS
[de-identified] : Ms. ELIAS RIBERA is a 64-year-old woman here today for a follow-up visit   She is s/p left lumpectomy and left SLNB on 8/16/17. Final pathology revealed infiltrating duct carcinoma, tumor size 2.5 x 1.5 cm, moderately differentiated, DCIS, 2/2 lymph nodes showing metastatic mammary duct carcinoma, negative margins. ER+/[place]+/HER2-, pT2 pN1a.  Oncotype 14.  She is also s/p left axillary dissection on 9/13/17. Final pathology revealed 12 lymph nodes NEGATIVE for carcinoma, negative margins.   She completed Radiation Therapy with Dr. Aleman on 1/2018. On Arimidex 1 mg daily.   She is s/p Right MARIBEL  localized lumpectomy for LCIS on 9/18/19.  Final pathology revealed LCIS, negative margins.    labs 8/2024 - WNL  B/L mammo/sono 8/2024 - BIRADS 2

## 2025-01-27 ENCOUNTER — RX RENEWAL (OUTPATIENT)
Age: 65
End: 2025-01-27

## 2025-03-03 ENCOUNTER — NON-APPOINTMENT (OUTPATIENT)
Age: 65
End: 2025-03-03

## 2025-03-03 ENCOUNTER — APPOINTMENT (OUTPATIENT)
Dept: SURGICAL ONCOLOGY | Facility: CLINIC | Age: 65
End: 2025-03-03
Payer: COMMERCIAL

## 2025-03-03 VITALS
RESPIRATION RATE: 17 BRPM | DIASTOLIC BLOOD PRESSURE: 77 MMHG | BODY MASS INDEX: 36.19 KG/M2 | HEIGHT: 64 IN | WEIGHT: 212 LBS | OXYGEN SATURATION: 98 % | SYSTOLIC BLOOD PRESSURE: 118 MMHG | HEART RATE: 74 BPM

## 2025-03-03 DIAGNOSIS — C50.512 MALIGNANT NEOPLASM OF LOWER-OUTER QUADRANT OF LEFT FEMALE BREAST: ICD-10-CM

## 2025-03-03 PROCEDURE — 99214 OFFICE O/P EST MOD 30 MIN: CPT

## 2025-07-24 NOTE — H&P PST ADULT - NSANTHOSAYNRD_GEN_A_CORE
Pt's home care calling stating pt was being taken care of for her hip, but now all she needs is cath care. They can't do just cath care for her, as that wouldn't be covered by insurance. They are asking if Dr Mejia can see pt for a cath change, which is due around 8/7. (Currently pt is scheduled on 9/17)    Please call [facility] in [facility] where she lives to set up an appt, [phone].   No. NEDA screening performed.  STOP BANG Legend: 0-2 = LOW Risk; 3-4 = INTERMEDIATE Risk; 5-8 = HIGH Risk No Exposure

## 2025-08-20 ENCOUNTER — RESULT REVIEW (OUTPATIENT)
Age: 65
End: 2025-08-20

## 2025-08-20 ENCOUNTER — OUTPATIENT (OUTPATIENT)
Dept: OUTPATIENT SERVICES | Facility: HOSPITAL | Age: 65
LOS: 1 days | End: 2025-08-20
Payer: COMMERCIAL

## 2025-08-20 ENCOUNTER — APPOINTMENT (OUTPATIENT)
Dept: ULTRASOUND IMAGING | Facility: IMAGING CENTER | Age: 65
End: 2025-08-20
Payer: COMMERCIAL

## 2025-08-20 ENCOUNTER — APPOINTMENT (OUTPATIENT)
Dept: MAMMOGRAPHY | Facility: IMAGING CENTER | Age: 65
End: 2025-08-20
Payer: COMMERCIAL

## 2025-08-20 DIAGNOSIS — Z98.890 OTHER SPECIFIED POSTPROCEDURAL STATES: Chronic | ICD-10-CM

## 2025-08-20 DIAGNOSIS — Z00.8 ENCOUNTER FOR OTHER GENERAL EXAMINATION: ICD-10-CM

## 2025-08-20 PROCEDURE — 77067 SCR MAMMO BI INCL CAD: CPT

## 2025-08-20 PROCEDURE — 76641 ULTRASOUND BREAST COMPLETE: CPT | Mod: 26,50

## 2025-08-20 PROCEDURE — 77063 BREAST TOMOSYNTHESIS BI: CPT | Mod: 26

## 2025-08-20 PROCEDURE — 76641 ULTRASOUND BREAST COMPLETE: CPT

## 2025-08-20 PROCEDURE — 77067 SCR MAMMO BI INCL CAD: CPT | Mod: 26

## 2025-08-20 PROCEDURE — 77063 BREAST TOMOSYNTHESIS BI: CPT

## 2025-09-08 ENCOUNTER — APPOINTMENT (OUTPATIENT)
Dept: SURGICAL ONCOLOGY | Facility: CLINIC | Age: 65
End: 2025-09-08
Payer: COMMERCIAL

## 2025-09-08 ENCOUNTER — NON-APPOINTMENT (OUTPATIENT)
Age: 65
End: 2025-09-08

## 2025-09-08 VITALS
OXYGEN SATURATION: 95 % | BODY MASS INDEX: 36.54 KG/M2 | SYSTOLIC BLOOD PRESSURE: 127 MMHG | HEART RATE: 75 BPM | DIASTOLIC BLOOD PRESSURE: 85 MMHG | HEIGHT: 64 IN | WEIGHT: 214 LBS

## 2025-09-08 DIAGNOSIS — C50.512 MALIGNANT NEOPLASM OF LOWER-OUTER QUADRANT OF LEFT FEMALE BREAST: ICD-10-CM

## 2025-09-08 DIAGNOSIS — D05.01 LOBULAR CARCINOMA IN SITU OF RIGHT BREAST: ICD-10-CM

## 2025-09-08 PROCEDURE — 99214 OFFICE O/P EST MOD 30 MIN: CPT
